# Patient Record
Sex: FEMALE | Race: OTHER | NOT HISPANIC OR LATINO | URBAN - METROPOLITAN AREA
[De-identification: names, ages, dates, MRNs, and addresses within clinical notes are randomized per-mention and may not be internally consistent; named-entity substitution may affect disease eponyms.]

---

## 2019-03-12 ENCOUNTER — OUTPATIENT (OUTPATIENT)
Dept: OUTPATIENT SERVICES | Facility: HOSPITAL | Age: 39
LOS: 1 days | End: 2019-03-12
Payer: COMMERCIAL

## 2019-03-12 DIAGNOSIS — Z3A.00 WEEKS OF GESTATION OF PREGNANCY NOT SPECIFIED: ICD-10-CM

## 2019-03-12 DIAGNOSIS — O26.899 OTHER SPECIFIED PREGNANCY RELATED CONDITIONS, UNSPECIFIED TRIMESTER: ICD-10-CM

## 2019-03-12 PROCEDURE — 99214 OFFICE O/P EST MOD 30 MIN: CPT

## 2019-05-13 ENCOUNTER — APPOINTMENT (OUTPATIENT)
Dept: ANTEPARTUM | Facility: CLINIC | Age: 39
End: 2019-05-13
Payer: COMMERCIAL

## 2019-05-13 PROCEDURE — 76816 OB US FOLLOW-UP PER FETUS: CPT

## 2019-05-13 PROCEDURE — 76820 UMBILICAL ARTERY ECHO: CPT

## 2019-05-13 PROCEDURE — 76819 FETAL BIOPHYS PROFIL W/O NST: CPT

## 2019-06-03 ENCOUNTER — RESULT REVIEW (OUTPATIENT)
Age: 39
End: 2019-06-03

## 2019-06-03 ENCOUNTER — INPATIENT (INPATIENT)
Facility: HOSPITAL | Age: 39
LOS: 5 days | Discharge: HOME | End: 2019-06-09
Attending: OBSTETRICS & GYNECOLOGY | Admitting: OBSTETRICS & GYNECOLOGY
Payer: COMMERCIAL

## 2019-06-03 ENCOUNTER — EMERGENCY (EMERGENCY)
Facility: HOSPITAL | Age: 39
LOS: 1 days | Discharge: HOME | End: 2019-06-03
Attending: EMERGENCY MEDICINE | Admitting: EMERGENCY MEDICINE
Payer: COMMERCIAL

## 2019-06-03 VITALS
DIASTOLIC BLOOD PRESSURE: 85 MMHG | HEIGHT: 63 IN | SYSTOLIC BLOOD PRESSURE: 127 MMHG | WEIGHT: 143.96 LBS | OXYGEN SATURATION: 99 % | HEART RATE: 96 BPM | TEMPERATURE: 98 F | RESPIRATION RATE: 17 BRPM

## 2019-06-03 VITALS
HEART RATE: 106 BPM | OXYGEN SATURATION: 98 % | SYSTOLIC BLOOD PRESSURE: 132 MMHG | RESPIRATION RATE: 18 BRPM | DIASTOLIC BLOOD PRESSURE: 88 MMHG

## 2019-06-03 DIAGNOSIS — K14.8 OTHER DISEASES OF TONGUE: ICD-10-CM

## 2019-06-03 DIAGNOSIS — Z79.1 LONG TERM (CURRENT) USE OF NON-STEROIDAL ANTI-INFLAMMATORIES (NSAID): ICD-10-CM

## 2019-06-03 DIAGNOSIS — R49.0 DYSPHONIA: ICD-10-CM

## 2019-06-03 DIAGNOSIS — Z79.899 OTHER LONG TERM (CURRENT) DRUG THERAPY: ICD-10-CM

## 2019-06-03 DIAGNOSIS — O26.899 OTHER SPECIFIED PREGNANCY RELATED CONDITIONS, UNSPECIFIED TRIMESTER: ICD-10-CM

## 2019-06-03 DIAGNOSIS — Z3A.00 WEEKS OF GESTATION OF PREGNANCY NOT SPECIFIED: ICD-10-CM

## 2019-06-03 DIAGNOSIS — Z88.0 ALLERGY STATUS TO PENICILLIN: ICD-10-CM

## 2019-06-03 DIAGNOSIS — T78.40XA ALLERGY, UNSPECIFIED, INITIAL ENCOUNTER: ICD-10-CM

## 2019-06-03 DIAGNOSIS — Z90.49 ACQUIRED ABSENCE OF OTHER SPECIFIED PARTS OF DIGESTIVE TRACT: ICD-10-CM

## 2019-06-03 LAB
ALBUMIN SERPL ELPH-MCNC: 3.4 G/DL — SIGNIFICANT CHANGE UP (ref 3.3–5)
ALBUMIN SERPL ELPH-MCNC: 4 G/DL — SIGNIFICANT CHANGE UP (ref 3.3–5)
ALP SERPL-CCNC: 118 U/L — SIGNIFICANT CHANGE UP (ref 40–120)
ALP SERPL-CCNC: 133 U/L — HIGH (ref 40–120)
ALT FLD-CCNC: 24 U/L — SIGNIFICANT CHANGE UP (ref 10–45)
ALT FLD-CCNC: 28 U/L — SIGNIFICANT CHANGE UP (ref 10–45)
ANION GAP SERPL CALC-SCNC: 12 MMOL/L — SIGNIFICANT CHANGE UP (ref 5–17)
ANION GAP SERPL CALC-SCNC: 13 MMOL/L — SIGNIFICANT CHANGE UP (ref 5–17)
APPEARANCE UR: CLEAR — SIGNIFICANT CHANGE UP
APTT BLD: 26.6 SEC — LOW (ref 27.5–36.3)
AST SERPL-CCNC: 27 U/L — SIGNIFICANT CHANGE UP (ref 10–40)
AST SERPL-CCNC: 45 U/L — HIGH (ref 10–40)
BASOPHILS # BLD AUTO: 0.01 K/UL — SIGNIFICANT CHANGE UP (ref 0–0.2)
BASOPHILS NFR BLD AUTO: 0.1 % — SIGNIFICANT CHANGE UP (ref 0–2)
BILIRUB SERPL-MCNC: 0.2 MG/DL — SIGNIFICANT CHANGE UP (ref 0.2–1.2)
BILIRUB SERPL-MCNC: 0.3 MG/DL — SIGNIFICANT CHANGE UP (ref 0.2–1.2)
BILIRUB UR-MCNC: NEGATIVE — SIGNIFICANT CHANGE UP
BLD GP AB SCN SERPL QL: NEGATIVE — SIGNIFICANT CHANGE UP
BLD GP AB SCN SERPL QL: NEGATIVE — SIGNIFICANT CHANGE UP
BUN SERPL-MCNC: 10 MG/DL — SIGNIFICANT CHANGE UP (ref 7–23)
BUN SERPL-MCNC: 9 MG/DL — SIGNIFICANT CHANGE UP (ref 7–23)
CALCIUM SERPL-MCNC: 9.3 MG/DL — SIGNIFICANT CHANGE UP (ref 8.4–10.5)
CALCIUM SERPL-MCNC: 9.7 MG/DL — SIGNIFICANT CHANGE UP (ref 8.4–10.5)
CHLORIDE SERPL-SCNC: 103 MMOL/L — SIGNIFICANT CHANGE UP (ref 96–108)
CHLORIDE SERPL-SCNC: 105 MMOL/L — SIGNIFICANT CHANGE UP (ref 96–108)
CO2 SERPL-SCNC: 22 MMOL/L — SIGNIFICANT CHANGE UP (ref 22–31)
CO2 SERPL-SCNC: 22 MMOL/L — SIGNIFICANT CHANGE UP (ref 22–31)
COLOR SPEC: YELLOW — SIGNIFICANT CHANGE UP
CREAT ?TM UR-MCNC: 12 MG/DL — SIGNIFICANT CHANGE UP
CREAT SERPL-MCNC: 0.65 MG/DL — SIGNIFICANT CHANGE UP (ref 0.5–1.3)
CREAT SERPL-MCNC: 0.68 MG/DL — SIGNIFICANT CHANGE UP (ref 0.5–1.3)
DIFF PNL FLD: NEGATIVE — SIGNIFICANT CHANGE UP
EOSINOPHIL # BLD AUTO: 0.08 K/UL — SIGNIFICANT CHANGE UP (ref 0–0.5)
EOSINOPHIL NFR BLD AUTO: 0.9 % — SIGNIFICANT CHANGE UP (ref 0–6)
FIBRINOGEN PPP-MCNC: 490 MG/DL — HIGH (ref 258–438)
GLUCOSE SERPL-MCNC: 76 MG/DL — SIGNIFICANT CHANGE UP (ref 70–99)
GLUCOSE SERPL-MCNC: 94 MG/DL — SIGNIFICANT CHANGE UP (ref 70–99)
GLUCOSE UR QL: NEGATIVE — SIGNIFICANT CHANGE UP
HCT VFR BLD CALC: 35.9 % — SIGNIFICANT CHANGE UP (ref 34.5–45)
HGB BLD-MCNC: 12.2 G/DL — SIGNIFICANT CHANGE UP (ref 11.5–15.5)
IMM GRANULOCYTES NFR BLD AUTO: 0.5 % — SIGNIFICANT CHANGE UP (ref 0–1.5)
INR BLD: 0.94 — SIGNIFICANT CHANGE UP (ref 0.88–1.16)
KETONES UR-MCNC: NEGATIVE — SIGNIFICANT CHANGE UP
LDH SERPL L TO P-CCNC: 284 U/L — HIGH (ref 50–242)
LEUKOCYTE ESTERASE UR-ACNC: NEGATIVE — SIGNIFICANT CHANGE UP
LYMPHOCYTES # BLD AUTO: 1.66 K/UL — SIGNIFICANT CHANGE UP (ref 1–3.3)
LYMPHOCYTES # BLD AUTO: 18.7 % — SIGNIFICANT CHANGE UP (ref 13–44)
MCHC RBC-ENTMCNC: 32.2 PG — SIGNIFICANT CHANGE UP (ref 27–34)
MCHC RBC-ENTMCNC: 34 GM/DL — SIGNIFICANT CHANGE UP (ref 32–36)
MCV RBC AUTO: 94.7 FL — SIGNIFICANT CHANGE UP (ref 80–100)
MONOCYTES # BLD AUTO: 0.67 K/UL — SIGNIFICANT CHANGE UP (ref 0–0.9)
MONOCYTES NFR BLD AUTO: 7.6 % — SIGNIFICANT CHANGE UP (ref 2–14)
NEUTROPHILS # BLD AUTO: 6.41 K/UL — SIGNIFICANT CHANGE UP (ref 1.8–7.4)
NEUTROPHILS NFR BLD AUTO: 72.2 % — SIGNIFICANT CHANGE UP (ref 43–77)
NITRITE UR-MCNC: NEGATIVE — SIGNIFICANT CHANGE UP
NRBC # BLD: 0 /100 WBCS — SIGNIFICANT CHANGE UP (ref 0–0)
PH UR: 6.5 — SIGNIFICANT CHANGE UP (ref 5–8)
PLATELET # BLD AUTO: 188 K/UL — SIGNIFICANT CHANGE UP (ref 150–400)
POTASSIUM SERPL-MCNC: 3.8 MMOL/L — SIGNIFICANT CHANGE UP (ref 3.5–5.3)
POTASSIUM SERPL-MCNC: 4.7 MMOL/L — SIGNIFICANT CHANGE UP (ref 3.5–5.3)
POTASSIUM SERPL-SCNC: 3.8 MMOL/L — SIGNIFICANT CHANGE UP (ref 3.5–5.3)
POTASSIUM SERPL-SCNC: 4.7 MMOL/L — SIGNIFICANT CHANGE UP (ref 3.5–5.3)
PROT ?TM UR-MCNC: <4 MG/DL — SIGNIFICANT CHANGE UP (ref 0–12)
PROT SERPL-MCNC: 6.6 G/DL — SIGNIFICANT CHANGE UP (ref 6–8.3)
PROT SERPL-MCNC: 7.8 G/DL — SIGNIFICANT CHANGE UP (ref 6–8.3)
PROT UR-MCNC: NEGATIVE MG/DL — SIGNIFICANT CHANGE UP
PROT/CREAT UR-RTO: <0.3 RATIO — HIGH (ref 0–0.2)
PROTHROM AB SERPL-ACNC: 10.6 SEC — SIGNIFICANT CHANGE UP (ref 10–12.9)
RBC # BLD: 3.79 M/UL — LOW (ref 3.8–5.2)
RBC # FLD: 13.4 % — SIGNIFICANT CHANGE UP (ref 10.3–14.5)
RH IG SCN BLD-IMP: POSITIVE — SIGNIFICANT CHANGE UP
RH IG SCN BLD-IMP: POSITIVE — SIGNIFICANT CHANGE UP
SODIUM SERPL-SCNC: 137 MMOL/L — SIGNIFICANT CHANGE UP (ref 135–145)
SODIUM SERPL-SCNC: 140 MMOL/L — SIGNIFICANT CHANGE UP (ref 135–145)
SP GR SPEC: <=1.005 — SIGNIFICANT CHANGE UP (ref 1–1.03)
URATE SERPL-MCNC: 4.8 MG/DL — SIGNIFICANT CHANGE UP (ref 2.5–7)
UROBILINOGEN FLD QL: 0.2 E.U./DL — SIGNIFICANT CHANGE UP
WBC # BLD: 8.87 K/UL — SIGNIFICANT CHANGE UP (ref 3.8–10.5)
WBC # FLD AUTO: 8.87 K/UL — SIGNIFICANT CHANGE UP (ref 3.8–10.5)

## 2019-06-03 PROCEDURE — 96375 TX/PRO/DX INJ NEW DRUG ADDON: CPT

## 2019-06-03 PROCEDURE — 99284 EMERGENCY DEPT VISIT MOD MDM: CPT

## 2019-06-03 PROCEDURE — 99284 EMERGENCY DEPT VISIT MOD MDM: CPT | Mod: 25

## 2019-06-03 PROCEDURE — 82570 ASSAY OF URINE CREATININE: CPT

## 2019-06-03 PROCEDURE — 96374 THER/PROPH/DIAG INJ IV PUSH: CPT

## 2019-06-03 PROCEDURE — 85025 COMPLETE CBC W/AUTO DIFF WBC: CPT

## 2019-06-03 PROCEDURE — 80053 COMPREHEN METABOLIC PANEL: CPT

## 2019-06-03 PROCEDURE — 81003 URINALYSIS AUTO W/O SCOPE: CPT

## 2019-06-03 PROCEDURE — 36415 COLL VENOUS BLD VENIPUNCTURE: CPT

## 2019-06-03 PROCEDURE — 84156 ASSAY OF PROTEIN URINE: CPT

## 2019-06-03 RX ORDER — SODIUM CHLORIDE 9 MG/ML
1000 INJECTION, SOLUTION INTRAVENOUS
Refills: 0 | Status: DISCONTINUED | OUTPATIENT
Start: 2019-06-03 | End: 2019-06-04

## 2019-06-03 RX ORDER — GENTAMICIN SULFATE 40 MG/ML
250 VIAL (ML) INJECTION ONCE
Refills: 0 | Status: COMPLETED | OUTPATIENT
Start: 2019-06-03 | End: 2019-06-03

## 2019-06-03 RX ORDER — CITRIC ACID/SODIUM CITRATE 300-500 MG
15 SOLUTION, ORAL ORAL EVERY 6 HOURS
Refills: 0 | Status: DISCONTINUED | OUTPATIENT
Start: 2019-06-03 | End: 2019-06-04

## 2019-06-03 RX ORDER — FAMOTIDINE 10 MG/ML
1 INJECTION INTRAVENOUS
Qty: 4 | Refills: 0
Start: 2019-06-03 | End: 2019-06-06

## 2019-06-03 RX ORDER — EPINEPHRINE 0.3 MG/.3ML
0.3 INJECTION INTRAMUSCULAR; SUBCUTANEOUS
Qty: 2 | Refills: 0
Start: 2019-06-03

## 2019-06-03 RX ORDER — OXYTOCIN 10 UNIT/ML
333.33 VIAL (ML) INJECTION
Qty: 20 | Refills: 0 | Status: DISCONTINUED | OUTPATIENT
Start: 2019-06-03 | End: 2019-06-04

## 2019-06-03 RX ORDER — BUPIVACAINE 13.3 MG/ML
10 INJECTION, SUSPENSION, LIPOSOMAL INFILTRATION ONCE
Refills: 0 | Status: DISCONTINUED | OUTPATIENT
Start: 2019-06-03 | End: 2019-06-09

## 2019-06-03 RX ORDER — AZITHROMYCIN 500 MG/1
500 TABLET, FILM COATED ORAL ONCE
Refills: 0 | Status: COMPLETED | OUTPATIENT
Start: 2019-06-03 | End: 2019-06-03

## 2019-06-03 RX ORDER — FAMOTIDINE 10 MG/ML
40 INJECTION INTRAVENOUS ONCE
Refills: 0 | Status: COMPLETED | OUTPATIENT
Start: 2019-06-03 | End: 2019-06-03

## 2019-06-03 RX ORDER — DIPHENHYDRAMINE HCL 50 MG
25 CAPSULE ORAL ONCE
Refills: 0 | Status: COMPLETED | OUTPATIENT
Start: 2019-06-03 | End: 2019-06-03

## 2019-06-03 RX ORDER — DIPHENHYDRAMINE HCL 50 MG
1 CAPSULE ORAL
Qty: 15 | Refills: 0
Start: 2019-06-03

## 2019-06-03 RX ADMIN — Medication 25 MILLIGRAM(S): at 16:26

## 2019-06-03 RX ADMIN — Medication 200 MILLIGRAM(S): at 23:10

## 2019-06-03 RX ADMIN — Medication 15 MILLILITER(S): at 23:11

## 2019-06-03 RX ADMIN — FAMOTIDINE 40 MILLIGRAM(S): 10 INJECTION INTRAVENOUS at 16:27

## 2019-06-03 RX ADMIN — SODIUM CHLORIDE 125 MILLILITER(S): 9 INJECTION, SOLUTION INTRAVENOUS at 23:09

## 2019-06-03 RX ADMIN — AZITHROMYCIN 255 MILLIGRAM(S): 500 TABLET, FILM COATED ORAL at 23:30

## 2019-06-03 RX ADMIN — Medication 100 MILLIGRAM(S): at 23:09

## 2019-06-03 NOTE — ED PROVIDER NOTE - CLINICAL SUMMARY MEDICAL DECISION MAKING FREE TEXT BOX
Pt w complaints of tongue swelling, improved after benadryl, recently started on DHA pill three days ago, allergic to morphine, vicodin, penicillin Pt w complaints of tongue swelling, improved after benadryl, recently started on DHA pill three days ago, allergic to morphine, vicodin, penicillin, to continue prn benadryl and pepcid. Steroids class C/D and sx ongoing for two days and improving, so will hold off. Advised use of epi pen for anaphylaxis, appreciate ENT/Gyn eval.

## 2019-06-03 NOTE — ED PROVIDER NOTE - CARE PROVIDERS DIRECT ADDRESSES
Problem: Safety  Goal: Will remain free from falls    Intervention: Assess risk factors for falls  Patient encouraged to use call light. Bed in lowest position.      Problem: Pain Management  Goal: Pain level will decrease to patient's comfort goal  Medicated for pain as ordered       [email or web address]

## 2019-06-03 NOTE — ED PROVIDER NOTE - CARE PROVIDER_API CALL
Annia Hudson)  [facility] Obstetrics and Gynecology  [address] [address]  Phone: [phone]  Fax: [phone]  Follow Up Time:     Mikaela Pina)  Rheumatology  [address] [address]  Phone: [phone]  Fax: [phone]  Follow Up Time:

## 2019-06-03 NOTE — ED PROVIDER NOTE - NSFOLLOWUPCLINICS_GEN_ALL_ED_FT
[facility] All[facility] and Immunology  Allergy  [address] [place] [address]  Phone: [phone]  Fax:   Follow Up Time:

## 2019-06-03 NOTE — ED PROVIDER NOTE - NSFOLLOWUPINSTRUCTIONS_ED_ALL_ED_FT
Allergies, Adult  An allergy is when your body's defense system (immune system) overreacts to an otherwise harmless substance (allergen) that you breathe in or eat or something that touches your skin. When you come into contact with something that you are allergic to, your immune system produces certain proteins (antibodies). These proteins cause cells to release chemicals (histamines) that trigger the symptoms of an allergic reaction.    Allergies often affect the nasal passages (allergic rhinitis), eyes (allergic conjunctivitis), skin (atopic dermatitis), and stomach. Allergies can be mild or severe. Allergies cannot spread from person to person (are not contagious). They can develop at any age and may be outgrown.    What increases the risk?  You may be at greater risk of allergies if other people in your family have allergies.    What are the signs or symptoms?  Symptoms depend on what type of allergy you have. They may include:  Runny, stuffy nose.  Sneezing.  Itchy mouth, ears, or throat.  Postnasal drip.  Sore throat.  Itchy, red, watery, or puffy eyes.  Skin rash or hives.  Stomach pain.  Vomiting.  Diarrhea.  Bloating.  Wheezing or coughing.  People with a severe allergy to food, medicine, or an insect bite may have a life-threatening allergic reaction (anaphylaxis). Symptoms of anaphylaxis include:  Hives.  Itching.  Flushed face.  Swollen lips, tongue, or mouth.  Tight or swollen throat.  Chest pain or tightness in the chest.  Trouble breathing or shortness of breath.  Rapid heartbeat.  Dizziness or fainting.  Vomiting.  Diarrhea.  Pain in the abdomen.  How is this diagnosed?  This condition is diagnosed based on:  Your symptoms.  Your family and medical history.  A physical exam.  You may need to see a health care provider who specializes in treating allergies (allergist). You may also have tests, including:  Skin tests to see which allergens are causing your symptoms, such as:  Skin prick test. In this test, your skin is pricked with a tiny needle and exposed to small amounts of possible allergens to see if your skin reacts.  Intradermal skin test. In this test, a small amount of allergen is injected under your skin to see if your skin reacts.  Patch test. In this test, a small amount of allergen is placed on your skin and then your skin is covered with a bandage. Your health care provider will check your skin after a couple of days to see if a rash has developed.  Blood tests.  Challenges tests. In this test, you inhale a small amount of allergen by mouth to see if you have an allergic reaction.  You may also be asked to:  Keep a food diary. A food diary is a record of all the foods and drinks you have in a day and any symptoms you experience.  Practice an elimination diet. An elimination diet involves eliminating specific foods from your diet and then adding them back in one by one to find out if a certain food causes an allergic reaction.  How is this treated?  Treatment for allergies depends on your symptoms. Treatment may include:  Cold compresses to soothe itching and swelling.  Eye drops.  Nasal sprays.  Using a saline spray or container (neti pot) to flush out the nose (nasal irrigation). These methods can help clear away mucus and keep the nasal passages moist.  Using a humidifier.  Oral antihistamines or other medicines to block allergic reaction and inflammation.  Skin creams to treat rashes or itching.  Diet changes to eliminate food allergy triggers.  Repeated exposure to tiny amounts of allergens to build up a tolerance and prevent future allergic reactions (immunotherapy). These include:  Allergy shots.  Oral treatment. This involves taking small doses of an allergen under the tongue (sublingual immunotherapy).  Emergency epinephrine injection (auto-injector) in case of an allergic emergency. This is a self-injectable, pre-measured medicine that must be given within the first few minutes of a serious allergic reaction.  Follow these instructions at home:  Image Image Image   Avoid known allergens whenever possible.  If you suffer from airborne allergens, wash out your nose daily. You can do this with a saline spray or a neti pot to flush out your nose (nasal irrigation).  Take over-the-counter and prescription medicines only as told by your health care provider.  Keep all follow-up visits as told by your health care provider. This is important.  If you are at risk of a severe allergic reaction (anaphylaxis), keep your auto-injector with you at all times.  If you have ever had anaphylaxis, wear a medical alert bracelet or necklace that states you have a severe allergy.  Contact a health care provider if:  Your symptoms do not improve with treatment.  Get help right away if:  You have symptoms of anaphylaxis, such as:  Swollen mouth, tongue, or throat.  Pain or tightness in your chest.  Trouble breathing or shortness of breath.  Dizziness or fainting.  Severe abdominal pain, vomiting, or diarrhea.  This information is not intended to replace advice given to you by your health care provider. Make sure you discuss any questions you have with your health care provider. Take benadryl 25mg every 8 hours for next three days  Pepcid 20mg in morning or next three days    Allergies, Adult  An allergy is when your body's defense system (immune system) overreacts to an otherwise harmless substance (allergen) that you breathe in or eat or something that touches your skin. When you come into contact with something that you are allergic to, your immune system produces certain proteins (antibodies). These proteins cause cells to release chemicals (histamines) that trigger the symptoms of an allergic reaction.    Allergies often affect the nasal passages (allergic rhinitis), eyes (allergic conjunctivitis), skin (atopic dermatitis), and stomach. Allergies can be mild or severe. Allergies cannot spread from person to person (are not contagious). They can develop at any age and may be outgrown.    What increases the risk?  You may be at greater risk of allergies if other people in your family have allergies.    What are the signs or symptoms?  Symptoms depend on what type of allergy you have. They may include:  Runny, stuffy nose.  Sneezing.  Itchy mouth, ears, or throat.  Postnasal drip.  Sore throat.  Itchy, red, watery, or puffy eyes.  Skin rash or hives.  Stomach pain.  Vomiting.  Diarrhea.  Bloating.  Wheezing or coughing.  People with a severe allergy to food, medicine, or an insect bite may have a life-threatening allergic reaction (anaphylaxis). Symptoms of anaphylaxis include:  Hives.  Itching.  Flushed face.  Swollen lips, tongue, or mouth.  Tight or swollen throat.  Chest pain or tightness in the chest.  Trouble breathing or shortness of breath.  Rapid heartbeat.  Dizziness or fainting.  Vomiting.  Diarrhea.  Pain in the abdomen.  How is this diagnosed?  This condition is diagnosed based on:  Your symptoms.  Your family and medical history.  A physical exam.  You may need to see a health care provider who specializes in treating allergies (allergist). You may also have tests, including:  Skin tests to see which allergens are causing your symptoms, such as:  Skin prick test. In this test, your skin is pricked with a tiny needle and exposed to small amounts of possible allergens to see if your skin reacts.  Intradermal skin test. In this test, a small amount of allergen is injected under your skin to see if your skin reacts.  Patch test. In this test, a small amount of allergen is placed on your skin and then your skin is covered with a bandage. Your health care provider will check your skin after a couple of days to see if a rash has developed.  Blood tests.  Challenges tests. In this test, you inhale a small amount of allergen by mouth to see if you have an allergic reaction.  You may also be asked to:  Keep a food diary. A food diary is a record of all the foods and drinks you have in a day and any symptoms you experience.  Practice an elimination diet. An elimination diet involves eliminating specific foods from your diet and then adding them back in one by one to find out if a certain food causes an allergic reaction.  How is this treated?  Treatment for allergies depends on your symptoms. Treatment may include:  Cold compresses to soothe itching and swelling.  Eye drops.  Nasal sprays.  Using a saline spray or container (neti pot) to flush out the nose (nasal irrigation). These methods can help clear away mucus and keep the nasal passages moist.  Using a humidifier.  Oral antihistamines or other medicines to block allergic reaction and inflammation.  Skin creams to treat rashes or itching.  Diet changes to eliminate food allergy triggers.  Repeated exposure to tiny amounts of allergens to build up a tolerance and prevent future allergic reactions (immunotherapy). These include:  Allergy shots.  Oral treatment. This involves taking small doses of an allergen under the tongue (sublingual immunotherapy).  Emergency epinephrine injection (auto-injector) in case of an allergic emergency. This is a self-injectable, pre-measured medicine that must be given within the first few minutes of a serious allergic reaction.  Follow these instructions at home:  Image Image Image   Avoid known allergens whenever possible.  If you suffer from airborne allergens, wash out your nose daily. You can do this with a saline spray or a neti pot to flush out your nose (nasal irrigation).  Take over-the-counter and prescription medicines only as told by your health care provider.  Keep all follow-up visits as told by your health care provider. This is important.  If you are at risk of a severe allergic reaction (anaphylaxis), keep your auto-injector with you at all times.  If you have ever had anaphylaxis, wear a medical alert bracelet or necklace that states you have a severe allergy.  Contact a health care provider if:  Your symptoms do not improve with treatment.  Get help right away if:  You have symptoms of anaphylaxis, such as:  Swollen mouth, tongue, or throat.  Pain or tightness in your chest.  Trouble breathing or shortness of breath.  Dizziness or fainting.  Severe abdominal pain, vomiting, or diarrhea.  This information is not intended to replace advice given to you by your health care provider. Make sure you discuss any questions you have with your health care provider.

## 2019-06-03 NOTE — ED PROVIDER NOTE - PHYSICAL EXAMINATION
CONSTITUTIONAL: Well appearing, well nourished, awake, alert and in no apparent distress.  HEENT: Head is atraumatic. Eyes clear bilaterally, normal EOMI. Airway patent. no OP edema  CARDIAC: Normal rate, regular rhythm.  Heart sounds S1, S2.   RESPIRATORY: Breath sounds clear and equal bilaterally. no tachypnea, respiratory distress.   GASTROINTESTINAL: Abdomen soft, non-tender, no guarding, distension.  MUSCULOSKELETAL: Spine appears normal, no midline spinal tenderness, range of motion is not limited, no muscle or joint tenderness. no bony tenderness. no JVD, peripheral edema.   NEUROLOGICAL: Alert and oriented, no focal deficits, no motor or sensory deficits.  SKIN: Skin normal color for race, warm, dry and intact. No evidence of rash.  PSYCHIATRIC: Alert and oriented to person, place, time/situation. normal mood and affect. no apparent risk to self or others.

## 2019-06-03 NOTE — CONSULT NOTE ADULT - SUBJECTIVE AND OBJECTIVE BOX
INCOMPLETE NOTE      HPI: 38y Female    Allergies  morphine (Anaphylaxis)  penicillin (Anaphylaxis)  Vicodin (Anaphylaxis)    PAST MEDICAL & SURGICAL HISTORY:  Appendicitis  Ovarian cyst  Uterine fibroid  No significant past surgical history    FAMILY HISTORY:  No pertinent family history in first degree relatives    Vital Signs Last 24 Hrs  T(C): 36.7 (03 Jun 2019 15:19), Max: 36.7 (03 Jun 2019 15:19)  T(F): 98 (03 Jun 2019 15:19), Max: 98 (03 Jun 2019 15:19)  HR: 96 (03 Jun 2019 15:19) (96 - 96)  BP: 127/85 (03 Jun 2019 15:19) (127/85 - 127/85)  RR: 17 (03 Jun 2019 15:19) (17 - 17)  SpO2: 99% (03 Jun 2019 15:19) (99% - 99%)      ENT EXAM-     LARYNGOSCOPY EXAM:     -Verbal consent was obtained from patient prior to procedure.    Anesthesia: Afrin spray was applied to the nasal cavities.    Flexible laryngoscopy was performed and revealed the following:    -- Nasopharynx had no mass or exudate.    -- Base of tongue was symmetric and not enlarged.    -- Vallecula was clear    -- Epiglottis, both aryepiglottic folds and both false vocal folds were normal    -- Arytenoids both without edema and erythema     -- True vocal folds were fully mobile and without lesions.     -- Post cricoid area was clear.    -- Interarytenoid edema was absent    The patient tolerated the procedure well.        Assessment/Plan:  38y 36week pregnant woman with globus and feeling of throat/tongue/lip swelling, with normal ENT exam today, likely due to reflux              [facility] at [phone] with any questions/concerns. INCOMPLETE NOTE      HPI: 38y Female, 36 weeks pregnant, presenting to the ER with sensation of globus, lip dryness/swelling, and tongue swelling. States that the lip swelling started about 2 weeks ago, and was accompanied by dry lips, erythema, peeling of skin of lips. Over the last 2 days also has had the sensation that her tongue feels bigger in her mouth, and feels that she bites it more often. Also     Allergies  morphine (Anaphylaxis)  penicillin (Anaphylaxis)  Vicodin (Anaphylaxis)    PAST MEDICAL & SURGICAL HISTORY:  Appendicitis  Ovarian cyst  Uterine fibroid  No significant past surgical history    FAMILY HISTORY:  No pertinent family history in first degree relatives    Vital Signs Last 24 Hrs  T(C): 36.7 (03 Jun 2019 15:19), Max: 36.7 (03 Jun 2019 15:19)  T(F): 98 (03 Jun 2019 15:19), Max: 98 (03 Jun 2019 15:19)  HR: 96 (03 Jun 2019 15:19) (96 - 96)  BP: 127/85 (03 Jun 2019 15:19) (127/85 - 127/85)  RR: 17 (03 Jun 2019 15:19) (17 - 17)  SpO2: 99% (03 Jun 2019 15:19) (99% - 99%)      ENT EXAM-     LARYNGOSCOPY EXAM:     -Verbal consent was obtained from patient prior to procedure.    Anesthesia: Afrin spray was applied to the nasal cavities.    Flexible laryngoscopy was performed and revealed the following:    -- Nasopharynx had no mass or exudate.    -- Base of tongue was symmetric and not enlarged.    -- Vallecula was clear    -- Epiglottis, both aryepiglottic folds and both false vocal folds were normal    -- Arytenoids both without edema and erythema     -- True vocal folds were fully mobile and without lesions.     -- Post cricoid area was clear.    -- Interarytenoid edema was absent    The patient tolerated the procedure well.        Assessment/Plan:  38y 36week pregnant woman with globus and feeling of throat/tongue/lip swelling, with normal ENT exam today, likely due to reflux              Page E[facility] at [phone] with any questions/concerns. HPI: 38y Female, 36 weeks pregnant, presenting to the ER with sensation of globus, lip dryness/swelling, and tongue swelling. States that the lip swelling started about 2 weeks ago, and was accompanied by dry lips, erythema, peeling of skin of lips, and darkening of skin of lips. Over the last 2 days also has had the sensation that her tongue feels bigger in her mouth, and feels that she bites it more often. Also feels heaviness over her stomach and chest, and increased reflux, and feeling that she has more difficulty swallowing food. Has had GERD throughout her pregnancy but has not been on any medications for this. Also notes that her hands and feet also feel very swollen.    She was taking a combined prenatal pill with DHA up to a few days ago until her insurance stopped covering this supplement so switched to over the counter prenatals with a separate DHA fish oil pill. Is on no other medication.    She has had anaphylaxis to morphine in the pas (says she had acute lip and throat swelling) 8-9 years ago, but she cannot remember if she was intubated for this, or what treatments she received for this.     Notes that she also feels that she has muffled hearing, no tinnitus, vertigo, otalgia.     Allergies  morphine (Anaphylaxis)  penicillin (Anaphylaxis)  Vicodin (Anaphylaxis)    PAST MEDICAL & SURGICAL HISTORY:  Appendicitis  Ovarian cyst  Uterine fibroid  No significant past surgical history    FAMILY HISTORY:  No pertinent family history in first degree relatives    Vital Signs Last 24 Hrs  T(C): 36.7 (03 Jun 2019 15:19), Max: 36.7 (03 Jun 2019 15:19)  T(F): 98 (03 Jun 2019 15:19), Max: 98 (03 Jun 2019 15:19)  HR: 96 (03 Jun 2019 15:19) (96 - 96)  BP: 127/85 (03 Jun 2019 15:19) (127/85 - 127/85)  RR: 17 (03 Jun 2019 15:19) (17 - 17)  SpO2: 99% (03 Jun 2019 15:19) (99% - 99%)      ENT EXAM-   EOMI, PERRL  CNs intact  lips appears slightly dry however not obviously swollen  no drooling, breathing quietly in room air, speaking in full sentences, voice clear, diction clear, tolerating secretions  no LAD  OP: MMM, tongue midline, FOM no edema, good dentition, uvula midline, no soft palate edema   AD: bilateral EAC with minimal dried cerumen, TM intact no fluid    LARYNGOSCOPY EXAM:     -Verbal consent was obtained from patient prior to procedure.    Flexible laryngoscopy was performed and revealed the following:    -- Nasopharynx had no mass or exudate.    -- Base of tongue was symmetric and not enlarged.    -- Vallecula was clear    -- Epiglottis, both aryepiglottic folds and both false vocal folds were normal    -- Arytenoids both without edema and erythema     -- True vocal folds were fully mobile and without lesions.     -- Post cricoid area was clear.    -- Interarytenoid edema was absent    The patient tolerated the procedure well.      Assessment/Plan:  38y 36week pregnant woman with globus and feeling of throat/tongue/lip swelling, with normal ENT exam today, causes could include reflux, or other pregnancy related edema/swelling. Airway patent, no edema of larynx. would also discontinue DHA pill as this has temporal relationship to tongue symptoms  - continue work up as per ER  - no further ENT interventions  - can follow up outpatient for hearing test if continues to have feeling of decreased hearing              Page E[facility] at [phone] with any questions/concerns.

## 2019-06-03 NOTE — ED ADULT NURSE NOTE - OBJECTIVE STATEMENT
Patient presents to the ED complaining of an allergic reaction, patient reports throat swelling, lip swelling. Patient speaking in full sentences. Patient does reports  tongue swelling. Patient is 36 weeks pregnant.

## 2019-06-03 NOTE — CONSULT NOTE ADULT - SUBJECTIVE AND OBJECTIVE BOX
39yo  at 34w presenting for evaluation due to sensation of swollen lips and throat.  She reports recently having swollen cracked lips with hyperpigmentation, and the sensation of swollen throat when swallowing.  She took half a benadryl last night and said symptoms improved, but was counseled to come to ED this morning for evaluation from OB.  She reports recently swithcing her prenatal vitamin prior to these symptoms beginning.  Pt denies fever, chills, chest pain, SOB, abdominal pain, nausea, vomiting, vaginal bleeding      OB/GYN Hx: G1 - MAB, G2 - current  PMHx: none  SHx: D&C, l/s cystectomy, l/s myomectomy, appendectomy  Meds: PNV  Allergies: penicillin - hives, morphine, vicodin - anaphylaxis    PHYSICAL EXAM:   Vital Signs Last 24 Hrs  T(C): 36.7 (2019 15:19), Max: 36.7 (2019 15:19)  T(F): 98 (2019 15:19), Max: 98 (2019 15:19)  HR: 96 (2019 15:19) (96 - 96)  BP: 127/85 (2019 15:19) (127/85 - 127/85)  BP(mean): --  RR: 17 (2019 15:19) (17 - 17)  SpO2: 99% (2019 15:19) (99% - 99%)    **************************  Constitutional: Alert & Oriented x3, No acute distress  Respiratory: Clear to ausculation bilaterally; no wheezing, rhonchi, or crackles  Cardiovascular: regular rate and rhythm, no murmurs, or gallops  Gastrointestinal: soft, non tender,gravid, no rebound or guarding   Extremities: no calf tenderness or swelling  bedside abdominal sonogram: fetus in breech presentation, BPP 8/8, DILLAN 10cm      LABS:                        12.2   8.87  )-----------( 188      ( 2019 17:42 )             35.9     06-03    137  |  103  |  10  ----------------------------<  94  3.8   |  22  |  0.68    Ca    9.3      2019 17:42    TPro  6.6  /  Alb  3.4  /  TBili  0.2  /  DBili  x   /  AST  27  /  ALT  24  /  AlkPhos  118  06-03      Urinalysis Basic - ( 2019 17:47 )    Color: Yellow / Appearance: Clear / SG: <=1.005 / pH: x  Gluc: x / Ketone: NEGATIVE  / Bili: Negative / Urobili: 0.2 E.U./dL   Blood: x / Protein: NEGATIVE mg/dL / Nitrite: NEGATIVE   Leuk Esterase: NEGATIVE / RBC: x / WBC x   Sq Epi: x / Non Sq Epi: x / Bacteria: x          RADIOLOGY & ADDITIONAL STUDIES: 37yo  at 34w presenting for evaluation due to sensation of swollen lips and throat.  She reports recently having swollen cracked lips with hyperpigmentation, and the sensation of swollen throat when swallowing.  She took half a benadryl last night and said symptoms improved, but was counseled to come to ED this morning for evaluation from OB.  She reports recently swithcing her prenatal vitamin prior to these symptoms beginning.  Pt denies fever, chills, chest pain, SOB, abdominal pain, nausea, vomiting, vaginal bleeding      OB/GYN Hx: G1 - MAB, G2 - current  PMHx: none  SHx: D&C, l/s cystectomy, l/s myomectomy, appendectomy  Meds: PNV  Allergies: penicillin - hives, morphine, vicodin - anaphylaxis    PHYSICAL EXAM:   Vital Signs Last 24 Hrs  T(C): 36.7 (2019 15:19), Max: 36.7 (2019 15:19)  T(F): 98 (2019 15:19), Max: 98 (2019 15:19)  HR: 96 (2019 15:19) (96 - 96)  BP: 127/85 (2019 15:19) (127/85 - 127/85)  BP(mean): --  RR: 17 (2019 15:19) (17 - 17)  SpO2: 99% (2019 15:19) (99% - 99%)    **************************  Constitutional: Alert & Oriented x3, No acute distress  Respiratory: Clear to ausculation bilaterally; no wheezing, rhonchi, or crackles  Cardiovascular: regular rate and rhythm, no murmurs, or gallops  Gastrointestinal: soft, non tender,gravid, no rebound or guarding   Extremities: no calf tenderness or swelling  bedside abdominal sonogram: fetus in breech presentation, BPP 8/8, DILLAN 10cm, FH 150s      LABS:                        12.2   8.87  )-----------( 188      ( 2019 17:42 )             35.9     06-03    137  |  103  |  10  ----------------------------<  94  3.8   |  22  |  0.68    Ca    9.3      2019 17:42    TPro  6.6  /  Alb  3.4  /  TBili  0.2  /  DBili  x   /  AST  27  /  ALT  24  /  AlkPhos  118  06-03      Urinalysis Basic - ( 2019 17:47 )    Color: Yellow / Appearance: Clear / SG: <=1.005 / pH: x  Gluc: x / Ketone: NEGATIVE  / Bili: Negative / Urobili: 0.2 E.U./dL   Blood: x / Protein: NEGATIVE mg/dL / Nitrite: NEGATIVE   Leuk Esterase: NEGATIVE / RBC: x / WBC x   Sq Epi: x / Non Sq Epi: x / Bacteria: x

## 2019-06-03 NOTE — ED ADULT TRIAGE NOTE - CHIEF COMPLAINT QUOTE
36wk pregnant c/o tongue / lip swelling, throat discomfort that started 2 days ago.  Took benadryl 25mg PO last night.  Known allergy to morphine, vicodin w/ anaphylaxis reaction.  Denies taking opiods.  Patient speaking clearly in full sentences.  Denies vaginal bleeding / abd pain

## 2019-06-03 NOTE — CONSULT NOTE ADULT - ASSESSMENT
37yo  at 34w presenting 39yo  at 34w presenting  with lip and throat swelling, possible allergic reaction.    - from obstetric standpoint, cleared for discharge.  - fetal heart rate and BPP reassuring  - recommend discontinuing current PNV due to possible reaction    D/W Dr. Mahajan

## 2019-06-03 NOTE — ED PROVIDER NOTE - OBJECTIVE STATEMENT
37 yo  with allergic reaction, pt states has sensation of tongue, heaviness, dry  lips and hoarse voice for two days, stated prior to that took prenatal vitamins w [facility] one day before. No sob, abd pain, vaginal bleeding, feels baby move. S/p appendectomy.

## 2019-06-03 NOTE — ED ADULT NURSE NOTE - NSIMPLEMENTINTERV_GEN_ALL_ED
Implemented All Universal Safety Interventions:  [place] to call system. Call bell, personal items and telephone within reach. Instruct patient to call for assistance. Room bathroom lighting operational. Non-slip footwear when patient is off stretcher. Physically safe environment: no spills, clutter or unnecessary equipment. Stretcher in lowest position, wheels locked, appropriate side rails in place.

## 2019-06-04 VITALS
RESPIRATION RATE: 16 BRPM | OXYGEN SATURATION: 100 % | DIASTOLIC BLOOD PRESSURE: 78 MMHG | TEMPERATURE: 96 F | SYSTOLIC BLOOD PRESSURE: 128 MMHG | HEART RATE: 80 BPM

## 2019-06-04 LAB
BASOPHILS # BLD AUTO: 0.01 K/UL — SIGNIFICANT CHANGE UP (ref 0–0.2)
BASOPHILS NFR BLD AUTO: 0.1 % — SIGNIFICANT CHANGE UP (ref 0–2)
EOSINOPHIL # BLD AUTO: 0 K/UL — SIGNIFICANT CHANGE UP (ref 0–0.5)
EOSINOPHIL NFR BLD AUTO: 0 % — SIGNIFICANT CHANGE UP (ref 0–6)
HCT VFR BLD CALC: 37.6 % — SIGNIFICANT CHANGE UP (ref 34.5–45)
HGB BLD-MCNC: 12.8 G/DL — SIGNIFICANT CHANGE UP (ref 11.5–15.5)
IMM GRANULOCYTES NFR BLD AUTO: 0.5 % — SIGNIFICANT CHANGE UP (ref 0–1.5)
LYMPHOCYTES # BLD AUTO: 1.19 K/UL — SIGNIFICANT CHANGE UP (ref 1–3.3)
LYMPHOCYTES # BLD AUTO: 8.4 % — LOW (ref 13–44)
MCHC RBC-ENTMCNC: 32 PG — SIGNIFICANT CHANGE UP (ref 27–34)
MCHC RBC-ENTMCNC: 34 GM/DL — SIGNIFICANT CHANGE UP (ref 32–36)
MCV RBC AUTO: 94 FL — SIGNIFICANT CHANGE UP (ref 80–100)
MONOCYTES # BLD AUTO: 0.44 K/UL — SIGNIFICANT CHANGE UP (ref 0–0.9)
MONOCYTES NFR BLD AUTO: 3.1 % — SIGNIFICANT CHANGE UP (ref 2–14)
NEUTROPHILS # BLD AUTO: 12.39 K/UL — HIGH (ref 1.8–7.4)
NEUTROPHILS NFR BLD AUTO: 87.9 % — HIGH (ref 43–77)
NRBC # BLD: 0 /100 WBCS — SIGNIFICANT CHANGE UP (ref 0–0)
PLATELET # BLD AUTO: 194 K/UL — SIGNIFICANT CHANGE UP (ref 150–400)
RBC # BLD: 4 M/UL — SIGNIFICANT CHANGE UP (ref 3.8–5.2)
RBC # FLD: 13.2 % — SIGNIFICANT CHANGE UP (ref 10.3–14.5)
WBC # BLD: 14.1 K/UL — HIGH (ref 3.8–10.5)
WBC # FLD AUTO: 14.1 K/UL — HIGH (ref 3.8–10.5)

## 2019-06-04 RX ORDER — GLYCERIN ADULT
1 SUPPOSITORY, RECTAL RECTAL AT BEDTIME
Refills: 0 | Status: DISCONTINUED | OUTPATIENT
Start: 2019-06-04 | End: 2019-06-09

## 2019-06-04 RX ORDER — TETANUS TOXOID, REDUCED DIPHTHERIA TOXOID AND ACELLULAR PERTUSSIS VACCINE, ADSORBED 5; 2.5; 8; 8; 2.5 [IU]/.5ML; [IU]/.5ML; UG/.5ML; UG/.5ML; UG/.5ML
0.5 SUSPENSION INTRAMUSCULAR ONCE
Refills: 0 | Status: DISCONTINUED | OUTPATIENT
Start: 2019-06-04 | End: 2019-06-09

## 2019-06-04 RX ORDER — DIPHENHYDRAMINE HCL 50 MG
25 CAPSULE ORAL EVERY 6 HOURS
Refills: 0 | Status: DISCONTINUED | OUTPATIENT
Start: 2019-06-04 | End: 2019-06-09

## 2019-06-04 RX ORDER — LANOLIN
1 OINTMENT (GRAM) TOPICAL EVERY 6 HOURS
Refills: 0 | Status: DISCONTINUED | OUTPATIENT
Start: 2019-06-04 | End: 2019-06-09

## 2019-06-04 RX ORDER — DOCUSATE SODIUM 100 MG
100 CAPSULE ORAL
Refills: 0 | Status: DISCONTINUED | OUTPATIENT
Start: 2019-06-04 | End: 2019-06-09

## 2019-06-04 RX ORDER — ACETAMINOPHEN 500 MG
975 TABLET ORAL EVERY 6 HOURS
Refills: 0 | Status: DISCONTINUED | OUTPATIENT
Start: 2019-06-04 | End: 2019-06-09

## 2019-06-04 RX ORDER — IBUPROFEN 200 MG
600 TABLET ORAL EVERY 6 HOURS
Refills: 0 | Status: COMPLETED | OUTPATIENT
Start: 2019-06-04 | End: 2020-05-02

## 2019-06-04 RX ORDER — FERROUS SULFATE 325(65) MG
325 TABLET ORAL DAILY
Refills: 0 | Status: DISCONTINUED | OUTPATIENT
Start: 2019-06-04 | End: 2019-06-09

## 2019-06-04 RX ORDER — SIMETHICONE 80 MG/1
80 TABLET, CHEWABLE ORAL EVERY 4 HOURS
Refills: 0 | Status: DISCONTINUED | OUTPATIENT
Start: 2019-06-04 | End: 2019-06-09

## 2019-06-04 RX ORDER — HEPARIN SODIUM 5000 [USP'U]/ML
5000 INJECTION INTRAVENOUS; SUBCUTANEOUS EVERY 12 HOURS
Refills: 0 | Status: DISCONTINUED | OUTPATIENT
Start: 2019-06-04 | End: 2019-06-09

## 2019-06-04 RX ORDER — MAGNESIUM HYDROXIDE 400 MG/1
30 TABLET, CHEWABLE ORAL
Refills: 0 | Status: DISCONTINUED | OUTPATIENT
Start: 2019-06-04 | End: 2019-06-09

## 2019-06-04 RX ORDER — SODIUM CHLORIDE 9 MG/ML
1000 INJECTION, SOLUTION INTRAVENOUS
Refills: 0 | Status: DISCONTINUED | OUTPATIENT
Start: 2019-06-04 | End: 2019-06-07

## 2019-06-04 RX ORDER — KETOROLAC TROMETHAMINE 30 MG/ML
30 SYRINGE (ML) INJECTION EVERY 6 HOURS
Refills: 0 | Status: DISCONTINUED | OUTPATIENT
Start: 2019-06-04 | End: 2019-06-05

## 2019-06-04 RX ORDER — OXYTOCIN 10 UNIT/ML
333.33 VIAL (ML) INJECTION
Qty: 20 | Refills: 0 | Status: DISCONTINUED | OUTPATIENT
Start: 2019-06-04 | End: 2019-06-09

## 2019-06-04 RX ADMIN — Medication 1 TABLET(S): at 14:48

## 2019-06-04 RX ADMIN — Medication 975 MILLIGRAM(S): at 08:57

## 2019-06-04 RX ADMIN — SIMETHICONE 80 MILLIGRAM(S): 80 TABLET, CHEWABLE ORAL at 18:18

## 2019-06-04 RX ADMIN — Medication 975 MILLIGRAM(S): at 22:00

## 2019-06-04 RX ADMIN — HEPARIN SODIUM 5000 UNIT(S): 5000 INJECTION INTRAVENOUS; SUBCUTANEOUS at 12:04

## 2019-06-04 RX ADMIN — Medication 100 MILLIGRAM(S): at 08:57

## 2019-06-04 RX ADMIN — Medication 325 MILLIGRAM(S): at 14:48

## 2019-06-04 RX ADMIN — Medication 30 MILLIGRAM(S): at 18:47

## 2019-06-04 RX ADMIN — Medication 975 MILLIGRAM(S): at 21:02

## 2019-06-04 RX ADMIN — Medication 30 MILLIGRAM(S): at 18:17

## 2019-06-04 RX ADMIN — Medication 30 MILLIGRAM(S): at 07:09

## 2019-06-04 RX ADMIN — Medication 975 MILLIGRAM(S): at 14:48

## 2019-06-04 RX ADMIN — Medication 30 MILLIGRAM(S): at 12:04

## 2019-06-04 RX ADMIN — SIMETHICONE 80 MILLIGRAM(S): 80 TABLET, CHEWABLE ORAL at 08:57

## 2019-06-04 RX ADMIN — Medication 30 MILLIGRAM(S): at 08:00

## 2019-06-04 RX ADMIN — Medication 975 MILLIGRAM(S): at 15:20

## 2019-06-04 RX ADMIN — Medication 30 MILLIGRAM(S): at 12:20

## 2019-06-04 RX ADMIN — Medication 975 MILLIGRAM(S): at 09:30

## 2019-06-04 NOTE — PROGRESS NOTE ADULT - SUBJECTIVE AND OBJECTIVE BOX
Patient evaluated at bedside.   She reports pain is well controlled.  She denies nausea, vomiting or heavy vaginal bleeding.  She has been not been OOB, campos is in place. She is passing gas, tolerating regular diet and is breastfeeding.    Physical Exam:  Vital Signs Last 24 Hrs  T(C): 36.9 (04 Jun 2019 06:13), Max: 36.9 (04 Jun 2019 06:13)  T(F): 98.4 (04 Jun 2019 06:13), Max: 98.4 (04 Jun 2019 06:13)  HR: 70 (04 Jun 2019 06:13) (70 - 106)  BP: 143/89 (04 Jun 2019 06:13) (125/86 - 151/91)  BP(mean): --  RR: 18 (04 Jun 2019 06:13) (16 - 18)  SpO2: 97% (04 Jun 2019 06:13) (96% - 100%)    GA: NAD, A+0 x 3  Abd: + BS, soft, nontender, nondistended, no rebound or guarding, incision clean, dry and intact, pressure dressing removed, steristrips in place, uterus firm at midline, 3 fb below umbilicus  : lochia WNL  Campos: clear urine with good output  Extremities: no swelling or calf tenderness                            12.8   14.10 )-----------( 194      ( 04 Jun 2019 07:07 )             37.6     06-03    140  |  105  |  9   ----------------------------<  76  4.7   |  22  |  0.65    Ca    9.7      03 Jun 2019 22:06    TPro  7.8  /  Alb  4.0  /  TBili  0.3  /  DBili  x   /  AST  45<H>  /  ALT  28  /  AlkPhos  133<H>  06-03      PT/INR - ( 03 Jun 2019 22:06 )   PT: 10.6 sec;   INR: 0.94          PTT - ( 03 Jun 2019 22:06 )  PTT:26.6 sec

## 2019-06-04 NOTE — PROVIDER CONTACT NOTE (OTHER) - SITUATION
Elevated BPs  -- at admission 0512 : 143/88; 0600 143/89. Pt denies double vision and blurred vision.

## 2019-06-04 NOTE — PROGRESS NOTE ADULT - SUBJECTIVE AND OBJECTIVE BOX
Post-op day 1  Doing well, +flatus, no BM. Pain is well-controlled, breastfeeding  VS- stable, afebrile /89  Breast- full, lactating. no erythema or nipple crack  Abd- soft, appropriately distended, +BS, Incision dry and clean, no drainage or erythema.  Pelvic- Lochia rubra, mildflow  Ext- Trace pedal edema, Juan Manuel's negative  Labs: CBC~                      12.8   14.10 )-----------( 194      ( 04 Jun 2019 07:07 )             37.6     Imp: Post-op day 1 stable  Plan: Advance diet            Ambulate            Elevate legs            repeat PEC labs in am, CBC c/w hemoconcentration

## 2019-06-04 NOTE — PROGRESS NOTE ADULT - ASSESSMENT
A/P   38y  s/p c/s, POD #1, stable  -  GHTN - mild range BPs; no toxic complaints  -  Pain: PO tylenol, motrin, hold oxycodone  -  GI: regular diet  -  : campos in situ; DC later today, f/u TOV  -  DVT prophylaxis: ambulation, SCDs, SQH  -  Dispo: POD 3 or 4

## 2019-06-05 RX ORDER — IBUPROFEN 200 MG
600 TABLET ORAL EVERY 6 HOURS
Refills: 0 | Status: DISCONTINUED | OUTPATIENT
Start: 2019-06-05 | End: 2019-06-09

## 2019-06-05 RX ADMIN — HEPARIN SODIUM 5000 UNIT(S): 5000 INJECTION INTRAVENOUS; SUBCUTANEOUS at 12:41

## 2019-06-05 RX ADMIN — SIMETHICONE 80 MILLIGRAM(S): 80 TABLET, CHEWABLE ORAL at 09:13

## 2019-06-05 RX ADMIN — Medication 975 MILLIGRAM(S): at 02:57

## 2019-06-05 RX ADMIN — Medication 600 MILLIGRAM(S): at 18:30

## 2019-06-05 RX ADMIN — Medication 1 APPLICATION(S): at 14:58

## 2019-06-05 RX ADMIN — SIMETHICONE 80 MILLIGRAM(S): 80 TABLET, CHEWABLE ORAL at 00:21

## 2019-06-05 RX ADMIN — Medication 30 MILLIGRAM(S): at 01:15

## 2019-06-05 RX ADMIN — Medication 975 MILLIGRAM(S): at 15:30

## 2019-06-05 RX ADMIN — Medication 100 MILLIGRAM(S): at 09:13

## 2019-06-05 RX ADMIN — SIMETHICONE 80 MILLIGRAM(S): 80 TABLET, CHEWABLE ORAL at 14:58

## 2019-06-05 RX ADMIN — Medication 600 MILLIGRAM(S): at 12:41

## 2019-06-05 RX ADMIN — Medication 30 MILLIGRAM(S): at 00:20

## 2019-06-05 RX ADMIN — Medication 600 MILLIGRAM(S): at 06:31

## 2019-06-05 RX ADMIN — Medication 975 MILLIGRAM(S): at 14:58

## 2019-06-05 RX ADMIN — HEPARIN SODIUM 5000 UNIT(S): 5000 INJECTION INTRAVENOUS; SUBCUTANEOUS at 00:21

## 2019-06-05 RX ADMIN — Medication 975 MILLIGRAM(S): at 21:00

## 2019-06-05 RX ADMIN — Medication 975 MILLIGRAM(S): at 09:14

## 2019-06-05 RX ADMIN — Medication 1 TABLET(S): at 09:13

## 2019-06-05 RX ADMIN — Medication 975 MILLIGRAM(S): at 03:50

## 2019-06-05 RX ADMIN — Medication 325 MILLIGRAM(S): at 09:13

## 2019-06-05 RX ADMIN — Medication 975 MILLIGRAM(S): at 22:00

## 2019-06-05 RX ADMIN — Medication 975 MILLIGRAM(S): at 09:45

## 2019-06-05 RX ADMIN — Medication 600 MILLIGRAM(S): at 18:02

## 2019-06-05 RX ADMIN — Medication 600 MILLIGRAM(S): at 13:10

## 2019-06-05 RX ADMIN — Medication 600 MILLIGRAM(S): at 07:15

## 2019-06-05 RX ADMIN — Medication 100 MILLIGRAM(S): at 00:21

## 2019-06-05 NOTE — PROVIDER CONTACT NOTE (OTHER) - RECOMMENDATIONS
@0700 noted bilateral golfball size masses in axilla. Mom reports that she developed them during pregnancy and that occasionally she develops numbness in the fingers. Masses are soft moveable no pain

## 2019-06-05 NOTE — PROVIDER CONTACT NOTE (OTHER) - SITUATION
Called at 0630 regarding persistent headache throughout shift. Med with Tylenol and gave first Motrin dose in 0600 hour alternating meds, given q3h per MD order. Pt has analphylactic rxn to opioids.

## 2019-06-05 NOTE — PROGRESS NOTE ADULT - SUBJECTIVE AND OBJECTIVE BOX
Post-op day 2  Doing well, +flatus, no BM. Pain is well-controlled, breastfeeding, no headaches or epigastric pain  VS- stable, afebrile /73  Breast- full, lactating. no erythema or nipple crack  Abd- soft, appropriately distended, +BS, Incision dry and clean, no drainage or erythema.  Pelvic- Lochia rubra, mildflow  Ext- Trace pedal edema, Juan Manuel's negative  Labs: CBC  Imp: Post-op day 2 stable  Plan: Advance diet            Ambulate            Elevate legs           repeat PEC labs in am

## 2019-06-06 LAB
ALBUMIN SERPL ELPH-MCNC: 3 G/DL — LOW (ref 3.3–5)
ALP SERPL-CCNC: 88 U/L — SIGNIFICANT CHANGE UP (ref 40–120)
ALT FLD-CCNC: 29 U/L — SIGNIFICANT CHANGE UP (ref 10–45)
ANION GAP SERPL CALC-SCNC: 9 MMOL/L — SIGNIFICANT CHANGE UP (ref 5–17)
AST SERPL-CCNC: 31 U/L — SIGNIFICANT CHANGE UP (ref 10–40)
BILIRUB SERPL-MCNC: 0.2 MG/DL — SIGNIFICANT CHANGE UP (ref 0.2–1.2)
BUN SERPL-MCNC: 12 MG/DL — SIGNIFICANT CHANGE UP (ref 7–23)
CALCIUM SERPL-MCNC: 9.8 MG/DL — SIGNIFICANT CHANGE UP (ref 8.4–10.5)
CHLORIDE SERPL-SCNC: 105 MMOL/L — SIGNIFICANT CHANGE UP (ref 96–108)
CO2 SERPL-SCNC: 22 MMOL/L — SIGNIFICANT CHANGE UP (ref 22–31)
CREAT SERPL-MCNC: 0.76 MG/DL — SIGNIFICANT CHANGE UP (ref 0.5–1.3)
GLUCOSE SERPL-MCNC: 85 MG/DL — SIGNIFICANT CHANGE UP (ref 70–99)
HCT VFR BLD CALC: 32.1 % — LOW (ref 34.5–45)
HGB BLD-MCNC: 10.7 G/DL — LOW (ref 11.5–15.5)
MCHC RBC-ENTMCNC: 32.1 PG — SIGNIFICANT CHANGE UP (ref 27–34)
MCHC RBC-ENTMCNC: 33.3 GM/DL — SIGNIFICANT CHANGE UP (ref 32–36)
MCV RBC AUTO: 96.4 FL — SIGNIFICANT CHANGE UP (ref 80–100)
NRBC # BLD: 0 /100 WBCS — SIGNIFICANT CHANGE UP (ref 0–0)
PLATELET # BLD AUTO: 182 K/UL — SIGNIFICANT CHANGE UP (ref 150–400)
POTASSIUM SERPL-MCNC: 4.7 MMOL/L — SIGNIFICANT CHANGE UP (ref 3.5–5.3)
POTASSIUM SERPL-SCNC: 4.7 MMOL/L — SIGNIFICANT CHANGE UP (ref 3.5–5.3)
PROT SERPL-MCNC: 6.1 G/DL — SIGNIFICANT CHANGE UP (ref 6–8.3)
RBC # BLD: 3.33 M/UL — LOW (ref 3.8–5.2)
RBC # FLD: 13.7 % — SIGNIFICANT CHANGE UP (ref 10.3–14.5)
SODIUM SERPL-SCNC: 136 MMOL/L — SIGNIFICANT CHANGE UP (ref 135–145)
SURGICAL PATHOLOGY STUDY: SIGNIFICANT CHANGE UP
URATE SERPL-MCNC: 5.2 MG/DL — SIGNIFICANT CHANGE UP (ref 2.5–7)
WBC # BLD: 8.79 K/UL — SIGNIFICANT CHANGE UP (ref 3.8–10.5)
WBC # FLD AUTO: 8.79 K/UL — SIGNIFICANT CHANGE UP (ref 3.8–10.5)

## 2019-06-06 RX ORDER — MAGNESIUM SULFATE 500 MG/ML
2 VIAL (ML) INJECTION
Qty: 40 | Refills: 0 | Status: DISCONTINUED | OUTPATIENT
Start: 2019-06-06 | End: 2019-06-07

## 2019-06-06 RX ORDER — MAGNESIUM SULFATE 500 MG/ML
4 VIAL (ML) INJECTION ONCE
Refills: 0 | Status: COMPLETED | OUTPATIENT
Start: 2019-06-06 | End: 2019-06-07

## 2019-06-06 RX ADMIN — Medication 975 MILLIGRAM(S): at 03:19

## 2019-06-06 RX ADMIN — Medication 975 MILLIGRAM(S): at 14:57

## 2019-06-06 RX ADMIN — Medication 975 MILLIGRAM(S): at 09:06

## 2019-06-06 RX ADMIN — Medication 1 TABLET(S): at 12:17

## 2019-06-06 RX ADMIN — Medication 975 MILLIGRAM(S): at 21:45

## 2019-06-06 RX ADMIN — Medication 600 MILLIGRAM(S): at 01:15

## 2019-06-06 RX ADMIN — Medication 975 MILLIGRAM(S): at 21:13

## 2019-06-06 RX ADMIN — Medication 600 MILLIGRAM(S): at 07:00

## 2019-06-06 RX ADMIN — Medication 100 MILLIGRAM(S): at 12:17

## 2019-06-06 RX ADMIN — Medication 600 MILLIGRAM(S): at 00:24

## 2019-06-06 RX ADMIN — Medication 600 MILLIGRAM(S): at 06:17

## 2019-06-06 RX ADMIN — Medication 325 MILLIGRAM(S): at 12:16

## 2019-06-06 RX ADMIN — Medication 600 MILLIGRAM(S): at 12:17

## 2019-06-06 RX ADMIN — Medication 600 MILLIGRAM(S): at 18:14

## 2019-06-06 RX ADMIN — Medication 975 MILLIGRAM(S): at 04:00

## 2019-06-06 RX ADMIN — HEPARIN SODIUM 5000 UNIT(S): 5000 INJECTION INTRAVENOUS; SUBCUTANEOUS at 12:17

## 2019-06-06 NOTE — PROGRESS NOTE ADULT - SUBJECTIVE AND OBJECTIVE BOX
Post-op day 3  Doing well, +flatus, + BM. Pain is well-controlled, breastfeeding, still c/o headaches, milder, no epigastric pain  VS- afebrile, BP elevated since 10pm last night 110- 160/, now 156/93  Breast- full, lactating. no erythema or nipple crack  Abd- soft, appropriately distended, +BS, Incision dry and clean, no drainage or erythema.  Pelvic- Lochia rubra, mildflow  Ext- Trace pedal edema, Juan Manuel's negative, no hyperreflexia  Labs                      10.7   8.79  )-----------( 182      ( 06 Jun 2019 06:18 ), uric acid 5.4             32.1     Imp: Post-op day 2/ Pre-eclampsia/ AMA  Plan: Transfer to L&D for Mgso4  for seizure prophylaxis            Monitor BP closely, if distolic is 100-110 IV labetalol            Muir to gravity and monitor I&O's             Plan d/w patient and , will anticipate discharge on Sunday if BP remains stable

## 2019-06-06 NOTE — PROGRESS NOTE ADULT - SUBJECTIVE AND OBJECTIVE BOX
Patient evaluated at bedside.   She reports pain is well controlled, although is having a severe headache when sitting up from lying flat, located parietally and occipitally. No blurry vision or epigastric pain.  She denies nausea, vomiting or heavy vaginal bleeding.  She has been ambulating without assistance, voiding spontaneously, passing gas, tolerating regular diet and is breastfeeding.    Physical Exam:  Vital Signs Last 24 Hrs  T(C): 36.7 (06 Jun 2019 06:01), Max: 37.4 (05 Jun 2019 18:54)  T(F): 98.1 (06 Jun 2019 06:01), Max: 99.3 (05 Jun 2019 18:54)  HR: 74 (06 Jun 2019 06:01) (74 - 88)  BP: 125/83 (06 Jun 2019 06:01) (122/77 - 137/85)  BP(mean): --  RR: 17 (06 Jun 2019 06:01) (17 - 20)  SpO2: 98% (06 Jun 2019 06:01) (97% - 100%)    GA: NAD, A+0 x 3  Abd: + BS, soft, nontender, nondistended, no rebound or guarding, incision clean, dry and intact, pressure dressing removed, steristrips in place, uterus firm at midline, 3 fb below umbilicus  : lochia WNL  Extremities: no swelling or calf tenderness                            10.7   8.79  )-----------( 182      ( 06 Jun 2019 06:18 )             32.1     06-06    136  |  105  |  12  ----------------------------<  85  4.7   |  22  |  0.76    Ca    9.8      06 Jun 2019 06:18    TPro  6.1  /  Alb  3.0<L>  /  TBili  0.2  /  DBili  x   /  AST  31  /  ALT  29  /  AlkPhos  88  06-06

## 2019-06-06 NOTE — PROGRESS NOTE ADULT - ASSESSMENT
A/P   38y  s/p c/s, POD #3, stable  -  Headache: likely spinal headache given that it is positional in nature; does not want intervention now but was made aware of blood patch if she cannot function; anesthesia consult PRN  -  Pain: PO tylenol, motrin, oxycodone PRN;   -  GI: reg diet  -  : voiding  -  DVT prophylaxis: ambulation, SCDs, SQH  -  Dispo: POD 4

## 2019-06-07 LAB
ALBUMIN SERPL ELPH-MCNC: 3.4 G/DL — SIGNIFICANT CHANGE UP (ref 3.3–5)
ALBUMIN SERPL ELPH-MCNC: 3.5 G/DL — SIGNIFICANT CHANGE UP (ref 3.3–5)
ALBUMIN SERPL ELPH-MCNC: 3.6 G/DL — SIGNIFICANT CHANGE UP (ref 3.3–5)
ALP SERPL-CCNC: 111 U/L — SIGNIFICANT CHANGE UP (ref 40–120)
ALP SERPL-CCNC: 115 U/L — SIGNIFICANT CHANGE UP (ref 40–120)
ALP SERPL-CCNC: 127 U/L — HIGH (ref 40–120)
ALT FLD-CCNC: 183 U/L — HIGH (ref 10–45)
ALT FLD-CCNC: 277 U/L — HIGH (ref 10–45)
ALT FLD-CCNC: 293 U/L — HIGH (ref 10–45)
ANION GAP SERPL CALC-SCNC: 12 MMOL/L — SIGNIFICANT CHANGE UP (ref 5–17)
ANION GAP SERPL CALC-SCNC: 13 MMOL/L — SIGNIFICANT CHANGE UP (ref 5–17)
ANION GAP SERPL CALC-SCNC: 13 MMOL/L — SIGNIFICANT CHANGE UP (ref 5–17)
ANION GAP SERPL CALC-SCNC: 14 MMOL/L — SIGNIFICANT CHANGE UP (ref 5–17)
APPEARANCE UR: CLEAR — SIGNIFICANT CHANGE UP
APTT BLD: 28.6 SEC — SIGNIFICANT CHANGE UP (ref 27.5–36.3)
AST SERPL-CCNC: 170 U/L — HIGH (ref 10–40)
AST SERPL-CCNC: 202 U/L — HIGH (ref 10–40)
AST SERPL-CCNC: 241 U/L — HIGH (ref 10–40)
BACTERIA # UR AUTO: PRESENT /HPF
BASOPHILS # BLD AUTO: 0.02 K/UL — SIGNIFICANT CHANGE UP (ref 0–0.2)
BASOPHILS NFR BLD AUTO: 0.2 % — SIGNIFICANT CHANGE UP (ref 0–2)
BILIRUB SERPL-MCNC: 0.2 MG/DL — SIGNIFICANT CHANGE UP (ref 0.2–1.2)
BILIRUB SERPL-MCNC: 0.2 MG/DL — SIGNIFICANT CHANGE UP (ref 0.2–1.2)
BILIRUB SERPL-MCNC: 0.3 MG/DL — SIGNIFICANT CHANGE UP (ref 0.2–1.2)
BILIRUB UR-MCNC: NEGATIVE — SIGNIFICANT CHANGE UP
BUN SERPL-MCNC: 11 MG/DL — SIGNIFICANT CHANGE UP (ref 7–23)
BUN SERPL-MCNC: 13 MG/DL — SIGNIFICANT CHANGE UP (ref 7–23)
BUN SERPL-MCNC: 14 MG/DL — SIGNIFICANT CHANGE UP (ref 7–23)
BUN SERPL-MCNC: 18 MG/DL — SIGNIFICANT CHANGE UP (ref 7–23)
CALCIUM SERPL-MCNC: 10 MG/DL — SIGNIFICANT CHANGE UP (ref 8.4–10.5)
CALCIUM SERPL-MCNC: 7.8 MG/DL — LOW (ref 8.4–10.5)
CALCIUM SERPL-MCNC: 8.4 MG/DL — SIGNIFICANT CHANGE UP (ref 8.4–10.5)
CALCIUM SERPL-MCNC: 9 MG/DL — SIGNIFICANT CHANGE UP (ref 8.4–10.5)
CHLORIDE SERPL-SCNC: 101 MMOL/L — SIGNIFICANT CHANGE UP (ref 96–108)
CHLORIDE SERPL-SCNC: 103 MMOL/L — SIGNIFICANT CHANGE UP (ref 96–108)
CO2 SERPL-SCNC: 22 MMOL/L — SIGNIFICANT CHANGE UP (ref 22–31)
CO2 SERPL-SCNC: 22 MMOL/L — SIGNIFICANT CHANGE UP (ref 22–31)
CO2 SERPL-SCNC: 23 MMOL/L — SIGNIFICANT CHANGE UP (ref 22–31)
CO2 SERPL-SCNC: 24 MMOL/L — SIGNIFICANT CHANGE UP (ref 22–31)
COLOR SPEC: YELLOW — SIGNIFICANT CHANGE UP
CREAT SERPL-MCNC: 0.66 MG/DL — SIGNIFICANT CHANGE UP (ref 0.5–1.3)
CREAT SERPL-MCNC: 0.68 MG/DL — SIGNIFICANT CHANGE UP (ref 0.5–1.3)
CREAT SERPL-MCNC: 0.74 MG/DL — SIGNIFICANT CHANGE UP (ref 0.5–1.3)
CREAT SERPL-MCNC: 0.84 MG/DL — SIGNIFICANT CHANGE UP (ref 0.5–1.3)
DIFF PNL FLD: ABNORMAL
EOSINOPHIL # BLD AUTO: 0.19 K/UL — SIGNIFICANT CHANGE UP (ref 0–0.5)
EOSINOPHIL NFR BLD AUTO: 2.3 % — SIGNIFICANT CHANGE UP (ref 0–6)
EPI CELLS # UR: SIGNIFICANT CHANGE UP /HPF (ref 0–5)
EXTRA GREEN TOP TUBE: SIGNIFICANT CHANGE UP
FIBRINOGEN PPP-MCNC: 497 MG/DL — HIGH (ref 258–438)
GLUCOSE SERPL-MCNC: 100 MG/DL — HIGH (ref 70–99)
GLUCOSE SERPL-MCNC: 105 MG/DL — HIGH (ref 70–99)
GLUCOSE SERPL-MCNC: 118 MG/DL — HIGH (ref 70–99)
GLUCOSE SERPL-MCNC: 99 MG/DL — SIGNIFICANT CHANGE UP (ref 70–99)
GLUCOSE UR QL: NEGATIVE — SIGNIFICANT CHANGE UP
HCT VFR BLD CALC: 32.6 % — LOW (ref 34.5–45)
HCT VFR BLD CALC: 34.9 % — SIGNIFICANT CHANGE UP (ref 34.5–45)
HGB BLD-MCNC: 11.1 G/DL — LOW (ref 11.5–15.5)
HGB BLD-MCNC: 11.8 G/DL — SIGNIFICANT CHANGE UP (ref 11.5–15.5)
IMM GRANULOCYTES NFR BLD AUTO: 0.6 % — SIGNIFICANT CHANGE UP (ref 0–1.5)
INR BLD: 0.86 — LOW (ref 0.88–1.16)
KETONES UR-MCNC: NEGATIVE — SIGNIFICANT CHANGE UP
LDH SERPL L TO P-CCNC: 390 U/L — HIGH (ref 50–242)
LEUKOCYTE ESTERASE UR-ACNC: NEGATIVE — SIGNIFICANT CHANGE UP
LYMPHOCYTES # BLD AUTO: 1.42 K/UL — SIGNIFICANT CHANGE UP (ref 1–3.3)
LYMPHOCYTES # BLD AUTO: 17.5 % — SIGNIFICANT CHANGE UP (ref 13–44)
MAGNESIUM SERPL-MCNC: 5.7 MG/DL — HIGH (ref 1.6–2.6)
MAGNESIUM SERPL-MCNC: 6 MG/DL — HIGH (ref 1.6–2.6)
MAGNESIUM SERPL-MCNC: 6.3 MG/DL — HIGH (ref 1.6–2.6)
MCHC RBC-ENTMCNC: 32.2 PG — SIGNIFICANT CHANGE UP (ref 27–34)
MCHC RBC-ENTMCNC: 32.3 PG — SIGNIFICANT CHANGE UP (ref 27–34)
MCHC RBC-ENTMCNC: 33.8 GM/DL — SIGNIFICANT CHANGE UP (ref 32–36)
MCHC RBC-ENTMCNC: 34 GM/DL — SIGNIFICANT CHANGE UP (ref 32–36)
MCV RBC AUTO: 94.8 FL — SIGNIFICANT CHANGE UP (ref 80–100)
MCV RBC AUTO: 95.4 FL — SIGNIFICANT CHANGE UP (ref 80–100)
MONOCYTES # BLD AUTO: 0.41 K/UL — SIGNIFICANT CHANGE UP (ref 0–0.9)
MONOCYTES NFR BLD AUTO: 5 % — SIGNIFICANT CHANGE UP (ref 2–14)
NEUTROPHILS # BLD AUTO: 6.03 K/UL — SIGNIFICANT CHANGE UP (ref 1.8–7.4)
NEUTROPHILS NFR BLD AUTO: 74.4 % — SIGNIFICANT CHANGE UP (ref 43–77)
NITRITE UR-MCNC: NEGATIVE — SIGNIFICANT CHANGE UP
NRBC # BLD: 0 /100 WBCS — SIGNIFICANT CHANGE UP (ref 0–0)
NRBC # BLD: 0 /100 WBCS — SIGNIFICANT CHANGE UP (ref 0–0)
PH UR: 7 — SIGNIFICANT CHANGE UP (ref 5–8)
PLATELET # BLD AUTO: 223 K/UL — SIGNIFICANT CHANGE UP (ref 150–400)
PLATELET # BLD AUTO: 230 K/UL — SIGNIFICANT CHANGE UP (ref 150–400)
POTASSIUM SERPL-MCNC: 4.3 MMOL/L — SIGNIFICANT CHANGE UP (ref 3.5–5.3)
POTASSIUM SERPL-MCNC: 4.3 MMOL/L — SIGNIFICANT CHANGE UP (ref 3.5–5.3)
POTASSIUM SERPL-MCNC: 4.4 MMOL/L — SIGNIFICANT CHANGE UP (ref 3.5–5.3)
POTASSIUM SERPL-MCNC: 4.9 MMOL/L — SIGNIFICANT CHANGE UP (ref 3.5–5.3)
POTASSIUM SERPL-SCNC: 4.3 MMOL/L — SIGNIFICANT CHANGE UP (ref 3.5–5.3)
POTASSIUM SERPL-SCNC: 4.3 MMOL/L — SIGNIFICANT CHANGE UP (ref 3.5–5.3)
POTASSIUM SERPL-SCNC: 4.4 MMOL/L — SIGNIFICANT CHANGE UP (ref 3.5–5.3)
POTASSIUM SERPL-SCNC: 4.9 MMOL/L — SIGNIFICANT CHANGE UP (ref 3.5–5.3)
PROT SERPL-MCNC: 6.6 G/DL — SIGNIFICANT CHANGE UP (ref 6–8.3)
PROT SERPL-MCNC: 6.7 G/DL — SIGNIFICANT CHANGE UP (ref 6–8.3)
PROT SERPL-MCNC: 7 G/DL — SIGNIFICANT CHANGE UP (ref 6–8.3)
PROT UR-MCNC: NEGATIVE MG/DL — SIGNIFICANT CHANGE UP
PROTHROM AB SERPL-ACNC: 9.7 SEC — LOW (ref 10–12.9)
RBC # BLD: 3.44 M/UL — LOW (ref 3.8–5.2)
RBC # BLD: 3.66 M/UL — LOW (ref 3.8–5.2)
RBC # FLD: 13.4 % — SIGNIFICANT CHANGE UP (ref 10.3–14.5)
RBC # FLD: 13.4 % — SIGNIFICANT CHANGE UP (ref 10.3–14.5)
RBC CASTS # UR COMP ASSIST: > 10 /HPF
SODIUM SERPL-SCNC: 138 MMOL/L — SIGNIFICANT CHANGE UP (ref 135–145)
SODIUM SERPL-SCNC: 139 MMOL/L — SIGNIFICANT CHANGE UP (ref 135–145)
SP GR SPEC: 1.01 — SIGNIFICANT CHANGE UP (ref 1–1.03)
URATE SERPL-MCNC: 5.5 MG/DL — SIGNIFICANT CHANGE UP (ref 2.5–7)
URATE SERPL-MCNC: 5.7 MG/DL — SIGNIFICANT CHANGE UP (ref 2.5–7)
UROBILINOGEN FLD QL: 0.2 E.U./DL — SIGNIFICANT CHANGE UP
WBC # BLD: 7.24 K/UL — SIGNIFICANT CHANGE UP (ref 3.8–10.5)
WBC # BLD: 8.12 K/UL — SIGNIFICANT CHANGE UP (ref 3.8–10.5)
WBC # FLD AUTO: 7.24 K/UL — SIGNIFICANT CHANGE UP (ref 3.8–10.5)
WBC # FLD AUTO: 8.12 K/UL — SIGNIFICANT CHANGE UP (ref 3.8–10.5)
WBC UR QL: < 5 /HPF — SIGNIFICANT CHANGE UP

## 2019-06-07 RX ORDER — SODIUM CHLORIDE 9 MG/ML
1000 INJECTION, SOLUTION INTRAVENOUS
Refills: 0 | Status: DISCONTINUED | OUTPATIENT
Start: 2019-06-07 | End: 2019-06-09

## 2019-06-07 RX ORDER — LABETALOL HCL 100 MG
100 TABLET ORAL EVERY 12 HOURS
Refills: 0 | Status: DISCONTINUED | OUTPATIENT
Start: 2019-06-07 | End: 2019-06-08

## 2019-06-07 RX ORDER — MAGNESIUM SULFATE 500 MG/ML
1.5 VIAL (ML) INJECTION
Qty: 40 | Refills: 0 | Status: DISCONTINUED | OUTPATIENT
Start: 2019-06-07 | End: 2019-06-09

## 2019-06-07 RX ADMIN — Medication 600 MILLIGRAM(S): at 01:06

## 2019-06-07 RX ADMIN — Medication 1 CAPSULE(S): at 21:09

## 2019-06-07 RX ADMIN — Medication 1 CAPSULE(S): at 14:45

## 2019-06-07 RX ADMIN — Medication 600 MILLIGRAM(S): at 08:36

## 2019-06-07 RX ADMIN — HEPARIN SODIUM 5000 UNIT(S): 5000 INJECTION INTRAVENOUS; SUBCUTANEOUS at 00:30

## 2019-06-07 RX ADMIN — Medication 600 MILLIGRAM(S): at 18:56

## 2019-06-07 RX ADMIN — Medication 100 GRAM(S): at 00:31

## 2019-06-07 RX ADMIN — Medication 1 TABLET(S): at 11:25

## 2019-06-07 RX ADMIN — Medication 37.5 GM/HR: at 21:15

## 2019-06-07 RX ADMIN — Medication 100 MILLIGRAM(S): at 21:09

## 2019-06-07 RX ADMIN — Medication 1 CAPSULE(S): at 22:05

## 2019-06-07 RX ADMIN — Medication 50 GM/HR: at 00:35

## 2019-06-07 RX ADMIN — Medication 600 MILLIGRAM(S): at 00:30

## 2019-06-07 RX ADMIN — Medication 100 MILLIGRAM(S): at 15:41

## 2019-06-07 RX ADMIN — Medication 1 CAPSULE(S): at 15:30

## 2019-06-07 RX ADMIN — Medication 600 MILLIGRAM(S): at 12:26

## 2019-06-07 RX ADMIN — HEPARIN SODIUM 5000 UNIT(S): 5000 INJECTION INTRAVENOUS; SUBCUTANEOUS at 11:26

## 2019-06-07 RX ADMIN — Medication 325 MILLIGRAM(S): at 11:25

## 2019-06-07 RX ADMIN — HEPARIN SODIUM 5000 UNIT(S): 5000 INJECTION INTRAVENOUS; SUBCUTANEOUS at 23:59

## 2019-06-07 RX ADMIN — Medication 600 MILLIGRAM(S): at 06:50

## 2019-06-07 RX ADMIN — Medication 975 MILLIGRAM(S): at 10:15

## 2019-06-07 RX ADMIN — Medication 600 MILLIGRAM(S): at 11:25

## 2019-06-07 RX ADMIN — Medication 1 APPLICATION(S): at 21:09

## 2019-06-07 RX ADMIN — Medication 975 MILLIGRAM(S): at 06:12

## 2019-06-07 RX ADMIN — Medication 975 MILLIGRAM(S): at 09:15

## 2019-06-07 RX ADMIN — Medication 975 MILLIGRAM(S): at 03:12

## 2019-06-07 NOTE — DIETITIAN INITIAL EVALUATION ADULT. - OTHER INFO
39 y/o female 35 and 6 weeks gestation s/p POD4 c/s now with pre-eclampsia.IVMg.No N/V/D or pain.Surgical incision.Eating adequate amounts.Patient is breast feeding.

## 2019-06-07 NOTE — PROGRESS NOTE ADULT - ASSESSMENT
A&P: 38y Female POD4 s/p CS complicated by preeclampsia with severe features  - Preeclampsia: Starting IV Magnesium for seizure prophylaxis and sending full labs.  Currently normotensive.  - VTE: SCDs, SQH if indicated.   - GI: Diet: clears as tolerated if not nauseous  - Neuro: Oral pain medications as needed: hold NSAIDs  - : strict Is and Os  - Discharge planning: discussed importance of BP check at home, will be evaluated by PP nursing re need for VNS. Discussed close follow up with OB within 1-2 wks. Reviewed toxic complaints with patient.

## 2019-06-07 NOTE — PROGRESS NOTE ADULT - SUBJECTIVE AND OBJECTIVE BOX
Post-op day 4  Doing well, +flatus, + BM. Pain is well-controlled, breastfeeding, c/omild headaches and mid-sternum discomfort.   VS-  afebrile BP after Labetolol 127/85  ( 148/96) UA- noproteinuria  Breast- full, lactating. no erythema or nipple crack, Axilary breast tissue engorged  Abd- soft, appropriately distended, +BS, Incision dry and clean, no drainage or erythema.  Pelvic- Lochia rubra, mildflow  Ext- Trace pedal edema, Juan Manuel's negative, no hyperreflexia  Muir to gravity draining clear urine 200cc/hr  Labs reviewed, Plt 282K, HBG10.6 LFT's elevated, cre 0.65 mg level6.3  Imp: Post-op day 4 /Atypical pre-eclampsia/ On Mag 1.5mg/hr  Plan: Continue Mgso4 prophylaxis for seizure prophalaxis            Mag level and monitor BP            continue labetalol 100mg BID             repeat PEC lab in am

## 2019-06-07 NOTE — CHART NOTE - NSCHARTNOTEFT_GEN_A_CORE
Pt transferred back to labor and delivery due to concern for preeclampsia with severe features.  Had severe range BP this afternoon and subsequently had intermittently mild range blood pressures the setting of new onset headaches.  Currently in mild range.  Starting IV Magnesium for seizure prophylaxis and sending full labs.  Strict I&Os, labs and clinical assessments q6 hours.

## 2019-06-07 NOTE — PROGRESS NOTE ADULT - SUBJECTIVE AND OBJECTIVE BOX
Patient evaluated at bedside for clinical magnesium check.   C/o DIALLO will get Fioricet.     She denies visual disturbances including scotoma, and right upper quadrant pain. Also denies nausea/vomiting/epigastric pain/shortness of breath. Pain well controlled.      T(C): 36.8 (06-07-19 @ 11:30), Max: 36.8 (06-07-19 @ 11:30)  HR: 95 (06-07-19 @ 11:30) (91 - 102)  BP: 132/87 (06-07-19 @ 11:30) (127/80 - 140/74)  RR: 19 (06-07-19 @ 11:30) (16 - 20)  SpO2: 97% (06-07-19 @ 11:30) (97% - 99%)      Gen: NAD  Pulm: CTAB  Abd: soft, nontender, no rebound or guarding, no epigastric tenderness, liver nonpalpable +BS, fundus palpated   Accessory breast lobe tail reaching the armpit bilateral, tender.  : Muir in place  Ext: Reflexes +                          11.8   7.24  )-----------( 230      ( 07 Jun 2019 06:56 )             34.9     06-07    138  |  103  |  11  ----------------------------<  105<H>  4.3   |  22  |  0.68    Ca    8.4      07 Jun 2019 11:53  Mg     6.3     06-07    TPro  7.0  /  Alb  3.6  /  TBili  0.3  /  DBili  x   /  AST  241<H>  /  ALT  293<H>  /  AlkPhos  127<H>  06-07 06-06-19 @ 07:01  -  06-07-19 @ 07:00  --------------------------------------------------------  IN: 0 mL / OUT: 2700 mL / NET: -2700 mL    06-07-19 @ 07:01  -  06-07-19 @ 13:52  --------------------------------------------------------  IN: 375 mL / OUT: 3000 mL / NET: -2625 mL          MEDICATIONS  (STANDING):  acetaminophen   Tablet .. 975 milliGRAM(s) Oral every 6 hours  acetaminophen 300 mG/butalbital 50 mG/ caffeine 40 mG 1 Capsule(s) Oral every 6 hours  BUpivacaine liposome 1.3% Injectable 10 milliLiter(s) Local Injection once  diphtheria/tetanus/pertussis (acellular) Vaccine (ADAcel) 0.5 milliLiter(s) IntraMuscular once  ferrous    sulfate 325 milliGRAM(s) Oral daily  heparin  Injectable 5000 Unit(s) SubCutaneous every 12 hours  ibuprofen  Tablet. 600 milliGRAM(s) Oral every 6 hours  lactated ringers. 1000 milliLiter(s) (75 mL/Hr) IV Continuous <Continuous>  magnesium sulfate Infusion 2 Gm/Hr (50 mL/Hr) IV Continuous <Continuous>  measles/mumps/rubella Vaccine 0.5 milliLiter(s) SubCutaneous once  oxytocin Infusion 333.333 milliUNIT(s)/Min (1000 mL/Hr) IV Continuous <Continuous>  prenatal multivitamin 1 Tablet(s) Oral daily    MEDICATIONS  (PRN):  diphenhydrAMINE 25 milliGRAM(s) Oral every 6 hours PRN Itching  docusate sodium 100 milliGRAM(s) Oral two times a day PRN Stool softening  glycerin Suppository - Adult 1 Suppository(s) Rectal at bedtime PRN Constipation  lanolin Ointment 1 Application(s) Topical every 6 hours PRN Sore Nipples  magnesium hydroxide Suspension 30 milliLiter(s) Oral two times a day PRN Constipation  simethicone 80 milliGRAM(s) Chew every 4 hours PRN Gas

## 2019-06-07 NOTE — DIETITIAN INITIAL EVALUATION ADULT. - ENERGY NEEDS
Ht:5ft 3inches,IBW:115lbs pre-pregnancy weight:110% of IBW.BMI:25.4  Needs based on pre-pregnancy weight:126lbs ([id number] plus 500kcal for breast feeding)1932kcal-2218kcal and 0.8gm(plus 25gmprotein)71gmprotein and 35-40cc fluids:2004cc-2290cc fluids

## 2019-06-07 NOTE — PROGRESS NOTE ADULT - SUBJECTIVE AND OBJECTIVE BOX
Pt transferred back to labor and delivery due to concern for preeclampsia with severe features.  Had severe range BP this afternoon and subsequently had intermittently mild range blood pressures the setting of new onset headaches. Currently she states that headache has slightly improved.  No visual disturbance or RUQ pain.  She denies any abdominal pain.  Concerned about pumping while on magnesium.      Physical Exam:  Vital Signs Last 24 Hrs  T(C): 37.1 (06 Jun 2019 21:51), Max: 37.5 (06 Jun 2019 11:53)  T(F): 98.8 (06 Jun 2019 21:51), Max: 99.5 (06 Jun 2019 11:53)  HR: 80 - 102  BP: 127/80 - 162/99  BP(mean): --    GA: NAD, A+0 x 3  Abd: + BS, soft, nontender, nondistended, no rebound or guarding  : lochia WNL  Extremities: no swelling or calf tenderness, +1 reflexes

## 2019-06-07 NOTE — PROGRESS NOTE ADULT - ASSESSMENT
A&P: 38y Female  POD#4 s/p complicated by preeclampsia with severe features.      1. Preeclampsia:   Continue IV Magnesium @2G/hr for 24 hrs post delivery.   Magnesium clinical checks and serum assay q6 hrs.  Next Mg check @ 18:15  No complaints currently.   BP controlled  LFT's are uptrending 241/293 (from 170/183) - will monitor with each Mg draw. no tenderness to precaution over the liver.   2. GI: Diet: Clears as tolerated  3. Neuro: PO pain medications PRN, hold NSAIDs  4. : strict Is and Os, campos in place

## 2019-06-07 NOTE — PROGRESS NOTE ADULT - ASSESSMENT
A/P   38y  s/p c/s, POD #4, now with preeclampsia with severe features given elevated blood pressures, headache, and newly rising liver enzymes  -  preeclampsia with severe features - currently on IV magnesium; Mg level pending in lab now; continue to closely monitor blood pressures as patient had severe range during day shift yesterday - if persistently over 150/100 will initiate oral antihypertensives; supportive care PRN for headache - plan to continue IV magnesium for 24 hrs   -  Pain: PO tylenol, motrin, oxycodone PRN   -  GI: regular diet  -  : voids  -  DVT prophylaxis: ambulation, SCDs, SQH  -  Dispo: will reassess after discontinuation of magnesium

## 2019-06-07 NOTE — PROGRESS NOTE ADULT - SUBJECTIVE AND OBJECTIVE BOX
Patient evaluated at bedside.   She reports pain is well controlled, however still is reporting persistent headache. Endorses fatigue, flushing, and very mild paresthesias. Denies shortness of breath or chest pain.   She denies nausea, vomiting or heavy vaginal bleeding.  She has been ambulating without assistance, voiding spontaneously, passing gas, tolerating regular diet and is breastfeeding.    Physical Exam:  Vital Signs Last 24 Hrs  T(C): 37.1 (06 Jun 2019 21:51), Max: 37.5 (06 Jun 2019 11:53)  T(F): 98.8 (06 Jun 2019 21:51), Max: 99.5 (06 Jun 2019 11:53)  HR: 93 (06 Jun 2019 21:51) (80 - 93)  BP: 156/93 (06 Jun 2019 21:51) (135/85 - 162/99)  BP(mean): --  RR: 20 (06 Jun 2019 21:51) (20 - 20)  SpO2: 99% (06 Jun 2019 21:51) (97% - 99%)    GA: NAD, A+0 x 3  Chest: normal work of breathing  Abd: + BS, soft, nontender, nondistended, no rebound or guarding, incision clean, dry and intact, pressure dressing removed, steristrips in place, uterus firm at midline, 3 fb below umbilicus  : lochia WNL  Neuro: 1+ patellar reflexes bilaterally  Extremities: no swelling or calf tenderness                            11.8   7.24  )-----------( 230      ( 07 Jun 2019 06:56 )             34.9     06-06    139  |  103  |  18  ----------------------------<  99  4.3   |  22  |  0.84    Ca    10.0      06 Jun 2019 23:57    TPro  6.7  /  Alb  3.5  /  TBili  0.2  /  DBili  x   /  AST  170<H>  /  ALT  183<H>  /  AlkPhos  111  06-06

## 2019-06-07 NOTE — DIETITIAN INITIAL EVALUATION ADULT. - NS AS NUTRI INTERV MEALS SNACK
Fluid - modified diet/Mineral - modified diet/Protein - modified diet/General/healthful diet/Energy - modified diet

## 2019-06-08 RX ORDER — LABETALOL HCL 100 MG
100 TABLET ORAL EVERY 8 HOURS
Refills: 0 | Status: DISCONTINUED | OUTPATIENT
Start: 2019-06-08 | End: 2019-06-09

## 2019-06-08 RX ADMIN — Medication 600 MILLIGRAM(S): at 12:30

## 2019-06-08 RX ADMIN — Medication 100 MILLIGRAM(S): at 18:18

## 2019-06-08 RX ADMIN — Medication 975 MILLIGRAM(S): at 15:30

## 2019-06-08 RX ADMIN — Medication 600 MILLIGRAM(S): at 07:24

## 2019-06-08 RX ADMIN — Medication 100 MILLIGRAM(S): at 03:55

## 2019-06-08 RX ADMIN — Medication 100 MILLIGRAM(S): at 22:20

## 2019-06-08 RX ADMIN — Medication 975 MILLIGRAM(S): at 09:30

## 2019-06-08 RX ADMIN — Medication 1 TABLET(S): at 13:29

## 2019-06-08 RX ADMIN — Medication 600 MILLIGRAM(S): at 06:24

## 2019-06-08 RX ADMIN — Medication 975 MILLIGRAM(S): at 21:49

## 2019-06-08 RX ADMIN — Medication 600 MILLIGRAM(S): at 01:00

## 2019-06-08 RX ADMIN — Medication 975 MILLIGRAM(S): at 15:00

## 2019-06-08 RX ADMIN — Medication 600 MILLIGRAM(S): at 12:00

## 2019-06-08 RX ADMIN — Medication 975 MILLIGRAM(S): at 23:45

## 2019-06-08 RX ADMIN — Medication 600 MILLIGRAM(S): at 00:01

## 2019-06-08 RX ADMIN — Medication 325 MILLIGRAM(S): at 13:29

## 2019-06-08 RX ADMIN — Medication 1 CAPSULE(S): at 03:56

## 2019-06-08 RX ADMIN — Medication 600 MILLIGRAM(S): at 18:30

## 2019-06-08 RX ADMIN — Medication 975 MILLIGRAM(S): at 09:00

## 2019-06-08 RX ADMIN — Medication 100 MILLIGRAM(S): at 06:23

## 2019-06-08 RX ADMIN — Medication 1 CAPSULE(S): at 04:55

## 2019-06-08 RX ADMIN — Medication 600 MILLIGRAM(S): at 18:00

## 2019-06-08 RX ADMIN — Medication 100 MILLIGRAM(S): at 06:24

## 2019-06-08 RX ADMIN — HEPARIN SODIUM 5000 UNIT(S): 5000 INJECTION INTRAVENOUS; SUBCUTANEOUS at 12:26

## 2019-06-08 NOTE — PROGRESS NOTE ADULT - SUBJECTIVE AND OBJECTIVE BOX
Patient evaluated at bedside.    She reports pain is well controlled. Denies headache currently. Feels so much better on IV magnesium.  She denies nausea, vomiting or heavy vaginal bleeding.   She has been ambulating without assistance, voiding spontaneously, passing gas, tolerating regular diet and is breastfeeding.     Physical Exam:  Vital Signs Last 24 Hrs  T(C): 36.7 (08 Jun 2019 07:21), Max: 36.8 (07 Jun 2019 11:30)  T(F): 98.1 (08 Jun 2019 07:21), Max: 98.3 (07 Jun 2019 20:02)  HR: 76 (08 Jun 2019 07:21) (76 - 97)  BP: 114/76 (08 Jun 2019 07:21) (114/76 - 151/91)  BP(mean): --  RR: 16 (08 Jun 2019 07:21) (16 - 19)  SpO2: 100% (08 Jun 2019 07:21) (95% - 100%)    GA: NAD, A+0 x 3  Abd: + BS, soft, nontender, nondistended, no rebound or guarding, incision clean, dry and intact, pressure dressing removed, steristrips in place, uterus firm at midline, 3 fb below umbilicus  : lochia WNL  Extremities: no swelling or calf tenderness                            11.8   7.24  )-----------( 230      ( 07 Jun 2019 06:56 )             34.9     06-07    138  |  101  |  13  ----------------------------<  118<H>  4.4   |  24  |  0.66    Ca    7.8<L>      07 Jun 2019 17:36  Mg     6.0     06-07    TPro  6.6  /  Alb  3.4  /  TBili  0.2  /  DBili  x   /  AST  202<H>  /  ALT  277<H>  /  AlkPhos  115  06-07      PT/INR - ( 07 Jun 2019 06:56 )   PT: 9.7 sec;   INR: 0.86          PTT - ( 07 Jun 2019 06:56 )  PTT:28.6 sec

## 2019-06-08 NOTE — PROGRESS NOTE ADULT - SUBJECTIVE AND OBJECTIVE BOX
Post-op day 5- severe pre-eclampsia  Doing well, +flatus, + BM. Pain is well-controlled, breastfeeding. s/p 24hr Mgso4, c/o mild headaches and mild pressure of eyes. No visual disturbances.  VS- Bp labile 110/74- 150/91  on labetelol 100mg BID  Breast- full, lactating. no erythema or nipple crack  Abd- soft, appropriately distended, +BS, Incision dry and clean, no drainage or erythema.  Pelvic- Lochia rubra, mildflow  Ext- Trace pedal edema, Juan Manuel's negative.   Labs:to be repeated in am prior of discharge  Imp: Post-op day 5 stable  Plan: Advance diet            Ambulate            Elevate legs            monitor Bp closely  if stable anticipate discharge in am

## 2019-06-08 NOTE — PROGRESS NOTE ADULT - ASSESSMENT
A/P   38y  s/p c/s, POD #5, with preeclampsia with severe features given elevated blood pressures, headache, and rising liver enzymes  -  preeclampsia with severe features - s/p IV magnesium, continue labetalol 100mg BID for hypertension; follow toxic symptoms closely; repeat PEC labs today  -  Pain: PO tylenol, motrin, oxycodone PRN   -  GI: regular diet  -  : voids  -  DVT prophylaxis: ambulation, SCDs, SQH  -  Dispo: when high BPs and toxic symptoms resolved

## 2019-06-09 ENCOUNTER — TRANSCRIPTION ENCOUNTER (OUTPATIENT)
Age: 39
End: 2019-06-09

## 2019-06-09 VITALS
RESPIRATION RATE: 18 BRPM | HEART RATE: 80 BPM | TEMPERATURE: 98 F | OXYGEN SATURATION: 98 % | SYSTOLIC BLOOD PRESSURE: 123 MMHG | DIASTOLIC BLOOD PRESSURE: 80 MMHG

## 2019-06-09 LAB
ALBUMIN SERPL ELPH-MCNC: 3.9 G/DL — SIGNIFICANT CHANGE UP (ref 3.3–5)
ALP SERPL-CCNC: 116 U/L — SIGNIFICANT CHANGE UP (ref 40–120)
ALT FLD-CCNC: 183 U/L — HIGH (ref 10–45)
ANION GAP SERPL CALC-SCNC: 12 MMOL/L — SIGNIFICANT CHANGE UP (ref 5–17)
AST SERPL-CCNC: 63 U/L — HIGH (ref 10–40)
BILIRUB SERPL-MCNC: 0.2 MG/DL — SIGNIFICANT CHANGE UP (ref 0.2–1.2)
BUN SERPL-MCNC: 18 MG/DL — SIGNIFICANT CHANGE UP (ref 7–23)
CALCIUM SERPL-MCNC: 10.4 MG/DL — SIGNIFICANT CHANGE UP (ref 8.4–10.5)
CHLORIDE SERPL-SCNC: 105 MMOL/L — SIGNIFICANT CHANGE UP (ref 96–108)
CO2 SERPL-SCNC: 22 MMOL/L — SIGNIFICANT CHANGE UP (ref 22–31)
CREAT SERPL-MCNC: 0.75 MG/DL — SIGNIFICANT CHANGE UP (ref 0.5–1.3)
GLUCOSE SERPL-MCNC: 84 MG/DL — SIGNIFICANT CHANGE UP (ref 70–99)
HCT VFR BLD CALC: 36.9 % — SIGNIFICANT CHANGE UP (ref 34.5–45)
HGB BLD-MCNC: 11.8 G/DL — SIGNIFICANT CHANGE UP (ref 11.5–15.5)
MCHC RBC-ENTMCNC: 31.8 PG — SIGNIFICANT CHANGE UP (ref 27–34)
MCHC RBC-ENTMCNC: 32 GM/DL — SIGNIFICANT CHANGE UP (ref 32–36)
MCV RBC AUTO: 99.5 FL — SIGNIFICANT CHANGE UP (ref 80–100)
NRBC # BLD: 0 /100 WBCS — SIGNIFICANT CHANGE UP (ref 0–0)
PLATELET # BLD AUTO: 232 K/UL — SIGNIFICANT CHANGE UP (ref 150–400)
POTASSIUM SERPL-MCNC: 4.8 MMOL/L — SIGNIFICANT CHANGE UP (ref 3.5–5.3)
POTASSIUM SERPL-SCNC: 4.8 MMOL/L — SIGNIFICANT CHANGE UP (ref 3.5–5.3)
PROT SERPL-MCNC: 7.1 G/DL — SIGNIFICANT CHANGE UP (ref 6–8.3)
RBC # BLD: 3.71 M/UL — LOW (ref 3.8–5.2)
RBC # FLD: 13.6 % — SIGNIFICANT CHANGE UP (ref 10.3–14.5)
SODIUM SERPL-SCNC: 139 MMOL/L — SIGNIFICANT CHANGE UP (ref 135–145)
WBC # BLD: 7.44 K/UL — SIGNIFICANT CHANGE UP (ref 3.8–10.5)
WBC # FLD AUTO: 7.44 K/UL — SIGNIFICANT CHANGE UP (ref 3.8–10.5)

## 2019-06-09 PROCEDURE — 85384 FIBRINOGEN ACTIVITY: CPT

## 2019-06-09 PROCEDURE — 81001 URINALYSIS AUTO W/SCOPE: CPT

## 2019-06-09 PROCEDURE — 80048 BASIC METABOLIC PNL TOTAL CA: CPT

## 2019-06-09 PROCEDURE — 88307 TISSUE EXAM BY PATHOLOGIST: CPT

## 2019-06-09 PROCEDURE — 84550 ASSAY OF BLOOD/URIC ACID: CPT

## 2019-06-09 PROCEDURE — 83615 LACTATE (LD) (LDH) ENZYME: CPT

## 2019-06-09 PROCEDURE — 83735 ASSAY OF MAGNESIUM: CPT

## 2019-06-09 PROCEDURE — 90707 MMR VACCINE SC: CPT

## 2019-06-09 PROCEDURE — 86901 BLOOD TYPING SEROLOGIC RH(D): CPT

## 2019-06-09 PROCEDURE — 85730 THROMBOPLASTIN TIME PARTIAL: CPT

## 2019-06-09 PROCEDURE — 99214 OFFICE O/P EST MOD 30 MIN: CPT

## 2019-06-09 PROCEDURE — 85027 COMPLETE CBC AUTOMATED: CPT

## 2019-06-09 PROCEDURE — 36415 COLL VENOUS BLD VENIPUNCTURE: CPT

## 2019-06-09 PROCEDURE — 85610 PROTHROMBIN TIME: CPT

## 2019-06-09 PROCEDURE — 86900 BLOOD TYPING SEROLOGIC ABO: CPT

## 2019-06-09 PROCEDURE — 86850 RBC ANTIBODY SCREEN: CPT

## 2019-06-09 PROCEDURE — 85025 COMPLETE CBC W/AUTO DIFF WBC: CPT

## 2019-06-09 PROCEDURE — 80053 COMPREHEN METABOLIC PANEL: CPT

## 2019-06-09 RX ORDER — LABETALOL HCL 100 MG
1 TABLET ORAL
Qty: 60 | Refills: 0
Start: 2019-06-09 | End: 2019-07-08

## 2019-06-09 RX ORDER — LABETALOL HCL 100 MG
200 TABLET ORAL EVERY 12 HOURS
Refills: 0 | Status: DISCONTINUED | OUTPATIENT
Start: 2019-06-09 | End: 2019-06-09

## 2019-06-09 RX ORDER — IBUPROFEN 200 MG
1 TABLET ORAL
Qty: 28 | Refills: 0
Start: 2019-06-09 | End: 2019-06-15

## 2019-06-09 RX ADMIN — Medication 975 MILLIGRAM(S): at 09:30

## 2019-06-09 RX ADMIN — Medication 600 MILLIGRAM(S): at 12:30

## 2019-06-09 RX ADMIN — Medication 975 MILLIGRAM(S): at 15:00

## 2019-06-09 RX ADMIN — Medication 100 MILLIGRAM(S): at 05:51

## 2019-06-09 RX ADMIN — Medication 600 MILLIGRAM(S): at 01:32

## 2019-06-09 RX ADMIN — Medication 600 MILLIGRAM(S): at 12:00

## 2019-06-09 RX ADMIN — Medication 975 MILLIGRAM(S): at 15:30

## 2019-06-09 RX ADMIN — Medication 1 TABLET(S): at 12:04

## 2019-06-09 RX ADMIN — Medication 325 MILLIGRAM(S): at 12:04

## 2019-06-09 RX ADMIN — Medication 200 MILLIGRAM(S): at 11:36

## 2019-06-09 RX ADMIN — Medication 0.5 MILLILITER(S): at 13:31

## 2019-06-09 RX ADMIN — Medication 975 MILLIGRAM(S): at 09:00

## 2019-06-09 NOTE — DISCHARGE NOTE OB - PATIENT PORTAL LINK FT
You can access the [facility] Patient Portal, offered by [facility], by registering with the following website: [email or web address]

## 2019-06-09 NOTE — DISCHARGE NOTE OB - HOSPITAL COURSE
Patient underwent a primary c/section over LFT with delivery of live male weighing 2760grams apgar 9/9. On Post-op day 3 had elevated BP increasingly higher with headaches, and patient was treated with MGSO$ for 24 hr due to suspected pre-eclampsia. She had no proteinuria, however, her LFT's were elevated. Her BP was maintained on Labetolol and increased to 200mg BID on day of discharge.  did well and both were discharge on day 6. Patient was given a BP monitor to check BP at home and f/u in 2 days with OB and Ptrimary care in 1week.

## 2019-06-09 NOTE — PROGRESS NOTE ADULT - SUBJECTIVE AND OBJECTIVE BOX
Post-op day 6  Doing well, +flatus, +BM. Pain is well-controlled, breastfeeding. c/o tingling of fingers and painful accessory breat tissue of axilla  VS-afebrile,  150-145/86-76  Breast- full, lactating. no erythema or nipple crack  Abd- soft, appropriately distended, +BS, Incision dry and clean, no drainage or erythema.  Pelvic- Lochia rubra, mildflow  Ext- Trace pedal edema, Juan Manuel's negative  Labs: CBC  Imp: Post-op day 6 stable/ PIH/ atypical pre-eclamsia  Plan: d/c home            Ambulate            Elevate legs            increase Labetolol to 200mg BID           f/u primary care in 1 wk          F/u OB in 2 days for BP check          BP monitor Q8h, call if diastolic is >100

## 2019-06-09 NOTE — PROGRESS NOTE ADULT - ASSESSMENT
A/P   38y  s/p c/s, POD #6, with preeclampsia with severe features given elevated blood pressures, headache, and rising liver enzymes  -  preeclampsia with severe features - s/p IV magnesium, continue labetalol 100mg BID for hypertension; follow toxic symptoms closely; repeat PEC labs today showed decreased LFT to 63/183, possible increase labetalol given BP remain >140/80  -  Pain: PO tylenol, motrin, oxycodone PRN   -  GI: regular diet  -  : voids  -  DVT prophylaxis: ambulation, SCDs, SQH  -  Dispo: when high BPs and toxic symptoms resolved

## 2019-06-09 NOTE — DISCHARGE NOTE OB - MEDICATION SUMMARY - MEDICATIONS TO TAKE
I will START or STAY ON the medications listed below when I get home from the hospital:    ibuprofen 600 mg oral tablet  -- 1 tab(s) by mouth every 6 hours   -- Do not take this drug if you are pregnant.  It is very important that you take or use this exactly as directed.  Do not skip doses or discontinue unless directed by your doctor.  May cause drowsiness or dizziness.  Obtain medical advice before taking any non-prescription drugs as some may affect the action of this medication.  Take with food or milk.    -- Indication: For Pain    labetalol 200 mg oral tablet  -- 1 tab(s) by mouth every 12 hours  -- Indication: For hypertension

## 2019-06-09 NOTE — DISCHARGE NOTE OB - CARE PLAN
Principal Discharge DX:	 premature rupture of membranes, delivered, current hospitalization  Goal:	monitor BP/ continue breastsfeeding  Assessment and plan of treatment:	ambulate  Secondary Diagnosis:	 delivery  Secondary Diagnosis:	Pre-eclampsia affecting puerperium  Secondary Diagnosis:	Pregnancy induced hypertension, postpartum  Secondary Diagnosis:	Fibroid

## 2019-06-09 NOTE — PROGRESS NOTE ADULT - SUBJECTIVE AND OBJECTIVE BOX
Patient evaluated at bedside this morning, resting comfortable in bed.   She reports pain is well controlled with motrin and tylenol. Denies toxic complaints. States she was abruptly awoken overnight, possible cause of elevated BP.   She denies headache, dizziness, chest pain, palpitations, shortness of breathe, nausea, vomiting or heavy vaginal bleeding.  She has been ambulating without assistance, voiding spontaneously, passing gas, tolerating regular diet and is breastfeeding.    Physical Exam:  Vital Signs Last 24 Hrs  T(C): 36.9 (09 Jun 2019 06:11), Max: 37.4 (08 Jun 2019 18:25)  T(F): 98.5 (09 Jun 2019 06:11), Max: 99.3 (08 Jun 2019 18:25)  HR: 76 (09 Jun 2019 06:11) (74 - 82)  BP: 148/88 (09 Jun 2019 06:11) (123/85 - 155/95)  BP(mean): --  RR: 17 (09 Jun 2019 06:11) (16 - 19)  SpO2: 98% (09 Jun 2019 06:11) (97% - 99%)    GA: NAD, A+0 x 3  CV: RRR  Pulm: CTAB  Breasts: soft, nontender, no palpable masses  Abd: + BS, soft, nontender, nondistended, no rebound or guarding, incision clean, dry and intact, uterus firm at midline,  2 fb below umbilicus  : lochia WNL  Extremities: no swelling or calf tenderness , reflexes 2+ bilaterally                             11.8   7.44  )-----------( 232      ( 09 Jun 2019 07:14 )             36.9     06-09    139  |  105  |  18  ----------------------------<  84  4.8   |  22  |  0.75    Ca    10.4      09 Jun 2019 07:14  Mg     6.0     06-07    TPro  7.1  /  Alb  3.9  /  TBili  0.2  /  DBili  x   /  AST  63<H>  /  ALT  183<H>  /  AlkPhos  116  06-09

## 2019-06-13 DIAGNOSIS — Z3A.35 35 WEEKS GESTATION OF PREGNANCY: ICD-10-CM

## 2019-06-13 DIAGNOSIS — O34.13 MATERNAL CARE FOR BENIGN TUMOR OF CORPUS UTERI, THIRD TRIMESTER: ICD-10-CM

## 2019-06-13 DIAGNOSIS — D25.9 LEIOMYOMA OF UTERUS, UNSPECIFIED: ICD-10-CM

## 2019-06-13 DIAGNOSIS — Z23 ENCOUNTER FOR IMMUNIZATION: ICD-10-CM

## 2020-03-30 ENCOUNTER — APPOINTMENT (OUTPATIENT)
Dept: OTOLARYNGOLOGY | Facility: CLINIC | Age: 40
End: 2020-03-30

## 2020-05-04 ENCOUNTER — APPOINTMENT (OUTPATIENT)
Dept: OTOLARYNGOLOGY | Facility: CLINIC | Age: 40
End: 2020-05-04

## 2021-10-06 NOTE — ED ADULT TRIAGE NOTE - NS ED TRIAGE CLINICAL UPGRADE
Increase fluid water intake  Rest  Use humidifier   Delsym as needed for cough  Tea/honey/lozenge for sore throat  Tylenol as needed for fever or pain  Afrin nasal spray for a maximum of three days  Call if symptoms worsen or fail to improve
Deteriorating patient status - Patient was clinically upgraded due to deteriorating patient status.

## 2023-08-11 ENCOUNTER — APPOINTMENT (OUTPATIENT)
Dept: BREAST CENTER | Facility: CLINIC | Age: 43
End: 2023-08-11
Payer: COMMERCIAL

## 2023-08-11 VITALS
HEART RATE: 74 BPM | RESPIRATION RATE: 16 BRPM | HEIGHT: 62.5 IN | BODY MASS INDEX: 23.33 KG/M2 | SYSTOLIC BLOOD PRESSURE: 131 MMHG | WEIGHT: 130 LBS | OXYGEN SATURATION: 96 % | DIASTOLIC BLOOD PRESSURE: 87 MMHG

## 2023-08-11 PROCEDURE — 99203 OFFICE O/P NEW LOW 30 MIN: CPT

## 2023-08-11 NOTE — REASON FOR VISIT
[Initial Evaluation] : an initial evaluation [FreeTextEntry1] : Patient is referred by Dr Fernandez for evaluation of bilateral axillary mass and bilateral nipple discharge

## 2023-08-11 NOTE — PHYSICAL EXAM
[Normocephalic] : normocephalic [Sclera nonicteric] : sclera nonicteric [No Supraclavicular Adenopathy] : no supraclavicular adenopathy [Normal Sinus Rhythm] : normal sinus rhythm [Symmetrical] : symmetrical [No dominant masses] : no dominant masses in right breast  [No dominant masses] : no dominant masses left breast [No Nipple Retraction] : no left nipple retraction [No Nipple Discharge] : no left nipple discharge [No Axillary Lymphadenopathy] : no left axillary lymphadenopathy [No Rashes] : no rashes [No Ulceration] : no ulceration [de-identified] : Bilateral ectopic tissue in axilla, no cellulitis, no drainage

## 2023-08-11 NOTE — HISTORY OF PRESENT ILLNESS
[FreeTextEntry1] : Patient is referred by Dr Magda Fernandez for evaluation of bilateral axillary mass and nipple discharge bilateral.  The nipple discharge has occurred for 6 years  She has no first-degree relatives with breast cancer  Annual mammograms are up-to-date and are unremarkable  The nipple discharge is described as green, cloudy  Her prolactin level is slightly elevated
none

## 2023-08-11 NOTE — ASSESSMENT
[FreeTextEntry1] : Bilateral ectopic tissue axilla  There is no evidence of abscess, suspicious lymphadenopathy  The patient also reports bilateral nipple discharge  She has no evidence of dominant mass or ductal dilatation  Options including observation or further screening with breast MRI were discussed  The patient would like to undergo breast MRI  Further recommendations will be made pending the results of the MRI  She was assisted in scheduling endocrine evaluation for further workup of elevated prolactin level

## 2023-08-11 NOTE — CONSULT LETTER
[Dear  ___] : Dear  [unfilled], [( Thank you for referring [unfilled] for consultation for _____ )] : Thank you for referring [unfilled] for consultation for [unfilled] [Consult Closing:] : Thank you very much for allowing me to participate in the care of this patient.  If you have any questions, please do not hesitate to contact me. [Sincerely,] : Sincerely, [FreeTextEntry3] : Fredy Rosenbaum

## 2023-08-20 ENCOUNTER — NON-APPOINTMENT (OUTPATIENT)
Age: 43
End: 2023-08-20

## 2023-08-21 DIAGNOSIS — Z87.891 PERSONAL HISTORY OF NICOTINE DEPENDENCE: ICD-10-CM

## 2023-08-21 DIAGNOSIS — Z87.2 PERSONAL HISTORY OF DISEASES OF THE SKIN AND SUBCUTANEOUS TISSUE: ICD-10-CM

## 2023-08-21 DIAGNOSIS — Z82.49 FAMILY HISTORY OF ISCHEMIC HEART DISEASE AND OTHER DISEASES OF THE CIRCULATORY SYSTEM: ICD-10-CM

## 2023-08-21 DIAGNOSIS — Z80.9 FAMILY HISTORY OF MALIGNANT NEOPLASM, UNSPECIFIED: ICD-10-CM

## 2023-08-21 DIAGNOSIS — Z83.3 FAMILY HISTORY OF DIABETES MELLITUS: ICD-10-CM

## 2023-12-03 ENCOUNTER — NON-APPOINTMENT (OUTPATIENT)
Age: 43
End: 2023-12-03

## 2023-12-04 ENCOUNTER — TRANSCRIPTION ENCOUNTER (OUTPATIENT)
Age: 43
End: 2023-12-04

## 2023-12-04 ENCOUNTER — APPOINTMENT (OUTPATIENT)
Dept: BREAST CENTER | Facility: CLINIC | Age: 43
End: 2023-12-04
Payer: COMMERCIAL

## 2023-12-04 ENCOUNTER — NON-APPOINTMENT (OUTPATIENT)
Age: 43
End: 2023-12-04

## 2023-12-04 VITALS
BODY MASS INDEX: 22.5 KG/M2 | WEIGHT: 127 LBS | HEART RATE: 107 BPM | DIASTOLIC BLOOD PRESSURE: 89 MMHG | SYSTOLIC BLOOD PRESSURE: 126 MMHG | HEIGHT: 63 IN

## 2023-12-04 DIAGNOSIS — Z78.9 OTHER SPECIFIED HEALTH STATUS: ICD-10-CM

## 2023-12-04 DIAGNOSIS — Q83.1 ACCESSORY BREAST: ICD-10-CM

## 2023-12-04 DIAGNOSIS — N64.52 NIPPLE DISCHARGE: ICD-10-CM

## 2023-12-04 PROCEDURE — 99204 OFFICE O/P NEW MOD 45 MIN: CPT

## 2023-12-04 RX ORDER — LORATADINE 5 MG/5 ML
SOLUTION, ORAL ORAL
Refills: 0 | Status: ACTIVE | COMMUNITY

## 2024-12-23 ENCOUNTER — APPOINTMENT (OUTPATIENT)
Dept: NEUROLOGY | Facility: CLINIC | Age: 44
End: 2024-12-23

## 2025-01-16 NOTE — ED PROVIDER NOTE - TOBACCO USE
Group Topic:  Group Psychotherapy    Date: 1/16/2025  Start Time: 10:00 AM  End Time: 11:00 AM  Facilitators: Rina Wilcox LCPC    Focus:  discussed topics for the day  Number in attendance: 8    Method: Group  Attendance: Present  Participation: Active  Patient Response: Appropriate feedback, Asked questions, and Attentive  Mood: Anxious  Affect: Type: Anxious   Range: Full (normal)   Congruency: Congruent   Stability: Stable  Behavior/Socialization: Appropriate to group, Cooperative, and Engaged  Thought Process: Focused and Goal-directed  Task Performance: Follows directions  Additional Information:Patient talked about her experience with college and that she dropped out when she fail a class and went to into a different career. Patient validated other peers experience with college. Patient was open and engaged in group.  Psychosocial Stressors: Interpersonal conflict  Symptom Notations: patient appeared anxious in group  Patient Evaluation: Independent - full participation     Unknown if ever smoked

## 2025-06-04 NOTE — ED PROVIDER NOTE - CCCP TRG CHIEF CMPLNT
allergic reaction/pregnancy comp Detail Level: Detailed Was A Bandage Applied: Yes Punch Size In Mm: 3 Size Of Lesion In Cm (Optional): 0 Depth Of Punch Biopsy: dermis Biopsy Type: H and E Anesthesia Type: 1% lidocaine with epinephrine Anesthesia Volume In Cc: 0.5 Hemostasis: None Epidermal Sutures: 4-0 Ethilon Wound Care: Petrolatum Dressing: bandage Suture Removal: 14 days Patient Will Remove Sutures At Home?: No Lab: 212 Consent: Written consent was obtained and risks were reviewed including but not limited to scarring, infection, bleeding, scabbing, incomplete removal, nerve damage and allergy to anesthesia. Post-Care Instructions: I reviewed with the patient in detail post-care instructions. Patient is to keep the biopsy site dry overnight, and then apply bacitracin twice daily until healed. Patient may apply hydrogen peroxide soaks to remove any crusting. Home Suture Removal Text: Patient was provided a home suture removal kit and will remove their sutures at home.  If they have any questions or difficulties they will call the office. Notification Instructions: Patient will be notified of biopsy results. However, patient instructed to call the office if not contacted within 2 weeks. Anticipated Plan (Based On Presumed Biopsy Results): Pt has been using clobetasol cream for 1 year Billing Type: Third-Party Bill Information: Selecting Yes will display possible errors in your note based on the variables you have selected. This validation is only offered as a suggestion for you. PLEASE NOTE THAT THE VALIDATION TEXT WILL BE REMOVED WHEN YOU FINALIZE YOUR NOTE. IF YOU WANT TO FAX A PRELIMINARY NOTE YOU WILL NEED TO TOGGLE THIS TO 'NO' IF YOU DO NOT WANT IT IN YOUR FAXED NOTE.